# Patient Record
Sex: FEMALE | Race: WHITE | HISPANIC OR LATINO | Employment: UNEMPLOYED | URBAN - METROPOLITAN AREA
[De-identification: names, ages, dates, MRNs, and addresses within clinical notes are randomized per-mention and may not be internally consistent; named-entity substitution may affect disease eponyms.]

---

## 2017-01-03 ENCOUNTER — HOSPITAL ENCOUNTER (OUTPATIENT)
Dept: LAB | Facility: MEDICAL CENTER | Age: 27
End: 2017-01-03
Attending: PHYSICIAN ASSISTANT
Payer: COMMERCIAL

## 2017-01-03 DIAGNOSIS — Z34.82 PRENATAL CARE, SUBSEQUENT PREGNANCY, SECOND TRIMESTER: ICD-10-CM

## 2017-01-03 LAB
ABO GROUP BLD: NORMAL
AMORPHOUS CRYSTALS 1764: PRESENT /HPF
APPEARANCE UR: ABNORMAL
BASOPHILS # BLD AUTO: 0.06 K/UL (ref 0–0.12)
BASOPHILS NFR BLD AUTO: 0.6 % (ref 0–1.8)
BILIRUB UR QL STRIP.AUTO: NEGATIVE
BLD GP AB SCN SERPL QL: NORMAL
COLOR UR AUTO: ABNORMAL
CULTURE IF INDICATED INDCX: NO UA CULTURE
EOSINOPHIL # BLD: 0.06 K/UL (ref 0–0.51)
EOSINOPHIL NFR BLD AUTO: 0.6 % (ref 0–6.9)
EPITHELIAL CELLS 1715: NORMAL /HPF
ERYTHROCYTE [DISTWIDTH] IN BLOOD BY AUTOMATED COUNT: 40.3 FL (ref 35.9–50)
GLUCOSE UR STRIP.AUTO-MCNC: NEGATIVE MG/DL
HBV SURFACE AG SERPL QL IA: NEGATIVE
HCT VFR BLD AUTO: 38.8 % (ref 37–47)
HGB BLD-MCNC: 13.4 G/DL (ref 12–16)
HIV 1+2 AB+HIV1 P24 AG SERPL QL IA: NON REACTIVE
IMM GRANULOCYTES # BLD AUTO: 0.06 K/UL (ref 0–0.11)
IMM GRANULOCYTES NFR BLD AUTO: 0.6 % (ref 0–0.9)
KETONES UR STRIP.AUTO-MCNC: NEGATIVE MG/DL
LEUKOCYTE ESTERASE UR QL STRIP.AUTO: NEGATIVE
LYMPHOCYTES # BLD: 1.71 K/UL (ref 1–4.8)
LYMPHOCYTES NFR BLD AUTO: 17.3 % (ref 22–41)
MCH RBC QN AUTO: 30 PG (ref 27–33)
MCHC RBC AUTO-ENTMCNC: 34.5 G/DL (ref 33.6–35)
MCV RBC AUTO: 87 FL (ref 81.4–97.8)
MICRO URNS: ABNORMAL
MONOCYTES # BLD: 0.64 K/UL (ref 0–0.85)
MONOCYTES NFR BLD AUTO: 6.5 % (ref 0–13.4)
MUCOUS THREADS URNS QL MICRO: NORMAL /HPF
NEUTROPHILS # BLD: 7.37 K/UL (ref 2–7.15)
NEUTROPHILS NFR BLD AUTO: 74.4 % (ref 44–72)
NITRITE UR QL STRIP.AUTO: NEGATIVE
NRBC # BLD AUTO: 0 K/UL
NRBC BLD-RTO: 0 /100 WBC
PH UR: 6 [PH]
PLATELET # BLD AUTO: 229 K/UL (ref 164–446)
PMV BLD AUTO: 10 FL (ref 9–12.9)
PROT UR QL STRIP: 50 MG/DL
RBC # BLD AUTO: 4.46 M/UL (ref 4.2–5.4)
RBC UR QL AUTO: NEGATIVE
RH BLD: NORMAL
RUBV IGG SERPL IA-ACNC: 132.5 IU/ML
SP GR UR STRIP.AUTO: 1.03
TREPONEMA PALLIDUM IGG+IGM AB [PRESENCE] IN SERUM OR PLASMA BY IMMUNOASSAY: NON REACTIVE
WBC # BLD AUTO: 9.9 K/UL (ref 4.8–10.8)

## 2017-01-06 ENCOUNTER — ROUTINE PRENATAL (OUTPATIENT)
Dept: OBGYN | Facility: CLINIC | Age: 27
End: 2017-01-06

## 2017-01-06 VITALS — WEIGHT: 191 LBS | BODY MASS INDEX: 29.05 KG/M2 | SYSTOLIC BLOOD PRESSURE: 104 MMHG | DIASTOLIC BLOOD PRESSURE: 58 MMHG

## 2017-01-06 DIAGNOSIS — Z34.01 SUPERVISION OF NORMAL FIRST PREGNANCY IN FIRST TRIMESTER: Primary | ICD-10-CM

## 2017-01-06 PROCEDURE — 90040 PR PRENATAL FOLLOW UP: CPT | Performed by: PHYSICIAN ASSISTANT

## 2017-01-06 NOTE — PROGRESS NOTES
OB f/u. some fetal movement yet.  No VB, LOF or UC's.  Brownish discharge one time last week  Wt: 191lb      BP: 104/58  Good phone # [phone]  Preferred pharmacy confirmed.  US scheduled for 1/26/17  PNP done  AFP pended  Yeast on pap: burning with intercourse.

## 2017-01-06 NOTE — MR AVS SNAPSHOT
Laura Susie Parks   2017 1:30 PM   Routine Prenatal   MRN: [id number]    Department:  [facility]   Dept Phone:  [phone]    Description:  Female : 1990   Provider:  STEPHANIA Alvarez           Allergies as of 2017     No Known Allergies      You were diagnosed with     Supervision of normal first pregnancy in first trimester   [8455830]  -  Primary       Vital Signs     Blood Pressure Weight Smoking Status             104/58 mmHg 86.637 kg (191 lb) Never Smoker          Basic Information     Date Of Birth Sex Race Ethnicity Preferred Language    1990 Female White  Origin (Uzbek,Hong Konger,Solomon Islander,Sierra Leonean, etc) English      Your appointments     2017  1:30 PM   US PREG 60 with PREG CTR US 1   [facility] ([facility])    [facility]  [address] [id number] [phone]           For [facility] patients only: 1. Please arrive 15 min prior to your appointment time. 2. If you're late, you will be rescheduled for the next available appointment. 3. If you need to reschedule your appointment, please call us at [phone] 48 hours prior to your appointment. 4. Do not bring children as they will not be allowed in exam room. 5. Only one family member may be present in room during exam. 6. The exam will be 30-60 minutes depending on the exam ordered by the physician. 7. The sonographer is not allowed to discuss findings during the exam. Your provider will go over the results with you at your next appointment. 8. The purpose of this ultrasound is to determine if baby is healthy. Diagnostic ultrasounds are NOT to determine the gender of the baby. 9. NO photography or video recording is allowed in exam room. 10. NO cell phones allowed in the exam room. INFORMACION SOBRE BHATT ULTRASONIDO 1. Por favor de llegar 15 minutos antes de bhatt mahesh. 2. Si llega tarde, le tenemos que cambiar la  mahesh para otra fecha. 3. Si necesita cambiar bhatt mahesh, por favor llame 48 horas antes de la mahesh. [phone] 4. Por favor no traer niños. No se permiten en cuarto de Ultrasonido. 5. Solamente se permite adolfo persona en el cuarto rosanna el examen. 6. El examen dura 30-60 minutos, dependiendo del examen ordenado por el Doctor. 7. El Sonógrafo no está autorizado hablar sobre bhatt examen. Bhatt doctor o partera le va explicar los resultados en bhatt próxima mahesh. 8. El propósito del Ultrasonido es para determinar si bhatt benita viene saludable. No es para determinar el sexo de bhatt benita. 9. Por favor no fotos o cámaras de grabar. 10. No celulares permitidos en el cuarto de examen.              Problem List              ICD-10-CM Priority Class Noted - Resolved    Supervision of normal first pregnancy in first trimester Z34.01   12/8/2016 - Present      Health Maintenance        Date Due Completion Dates    IMM HEP B VACCINE (1 of 3 - Primary Series) 1990 ---    IMM HEP A VACCINE (1 of 2 - Standard Series) 12/3/1992 ---    IMM HPV VACCINE (1 of 3 - Female 3 Dose Series) 12/3/2002 ---    IMM VARICELLA (CHICKENPOX) VACCINE (1 of 2 - 2 Dose Adolescent Series) 12/3/2004 ---    IMM DTaP/Tdap/Td Vaccine (1 - Tdap) 12/3/2010 ---    IMM INFLUENZA (1) 9/1/2016 ---    PAP SMEAR 12/8/2019 12/8/2016            Current Immunizations     No immunizations on file.      Below and/or attached are the medications your provider expects you to take. Review all of your home medications and newly ordered medications with your provider and/or pharmacist. Follow medication instructions as directed by your provider and/or pharmacist. Please keep your medication list with you and share with your provider. Update the information when medications are discontinued, doses are changed, or new medications (including over-the-counter products) are added; and carry medication information at all times in the event of emergency situations     Allergies:  No Known  Allergies          Medications  Valid as of: January 06, 2017 -  1:51 PM    Generic Name Brand Name Tablet Size Instructions for use    Folic Acid   Take  by mouth.        Nitrofurantoin Monohyd Macro   Take  by mouth.        Prenatal MV-Min-Fe Fum-FA-DHA   Take  by mouth.        .                 Medicines prescribed today were sent to:     [facility] [address] [place] [address], [place] - [address]    [address] [place] [address]    Phone: [phone] Fax: [phone]    Open 24 Hours?: No      Medication refill instructions:       If your prescription bottle indicates you have medication refills left, it is not necessary to call your provider’s office. Please contact your pharmacy and they will refill your medication.    If your prescription bottle indicates you do not have any refills left, you may request refills at any time through one of the following ways: The online [employer] system (except Urgent Care), by calling your provider’s office, or by asking your pharmacy to contact your provider’s office with a refill request. Medication refills are processed only during regular business hours and may not be available until the next business day. Your provider may request additional information or to have a follow-up visit with you prior to refilling your medication.   *Please Note: Medication refills are assigned a new Rx number when refilled electronically. Your pharmacy may indicate that no refills were authorized even though a new prescription for the same medication is available at the pharmacy. Please request the medicine by name with the pharmacy before contacting your provider for a refill.        Your To Do List     Future Labs/Procedures Complete By [employer]pmirna    [facility] TETRA  As directed 1/7/2018         [employer] Access Code: Activation code not generated  Current [employer] Status: Active

## 2017-01-06 NOTE — PROGRESS NOTES
Pt has no complaints with cramping, bleeding or pain, but pt has had occ burning with intercourse only. +FM. PAP, GC/CT, PNL wnl - pt notified of results. Yeast seen on PAP, but wet mt today shows no yeast, clue cells or trich. Pt urged to try lubricant with intercourse, call if pain occurs. AFP slip given today. US 1/26. RTC 4 wk or sooner prn.

## 2017-01-23 ENCOUNTER — HOSPITAL ENCOUNTER (OUTPATIENT)
Dept: LAB | Facility: MEDICAL CENTER | Age: 27
End: 2017-01-23
Attending: PHYSICIAN ASSISTANT
Payer: COMMERCIAL

## 2017-01-23 DIAGNOSIS — Z34.01 SUPERVISION OF NORMAL FIRST PREGNANCY IN FIRST TRIMESTER: ICD-10-CM

## 2017-01-25 LAB
# FETUSES US: NORMAL
AFP MOM SERPL: 0.57
AFP SERPL-MCNC: 26 NG/ML
AGE - REPORTED: 25.5 YR
GA METHOD: NORMAL
GA: 19.71 WEEKS
HCG MOM SERPL: 0.79
HCG SERPL-ACNC: NORMAL IU/L
IDDM PATIENT QL: NO
INHIBIN A MOM SERPL: 0.78
INHIBIN A SERPL-MCNC: 121 PG/ML
INTEGRATED SCN PATIENT-IMP: NORMAL
PATHOLOGY STUDY: NORMAL
U ESTRIOL MOM SERPL: 0.9
U ESTRIOL SERPL-MCNC: 1.71 NG/ML

## 2017-01-26 ENCOUNTER — DATING (OUTPATIENT)
Dept: OBGYN | Facility: CLINIC | Age: 27
End: 2017-01-26

## 2017-01-26 ENCOUNTER — APPOINTMENT (OUTPATIENT)
Dept: RADIOLOGY | Facility: IMAGING CENTER | Age: 27
End: 2017-01-26
Attending: PHYSICIAN ASSISTANT

## 2017-01-26 DIAGNOSIS — Z34.82 PRENATAL CARE, SUBSEQUENT PREGNANCY, SECOND TRIMESTER: ICD-10-CM

## 2017-01-26 PROCEDURE — 76805 OB US >/= 14 WKS SNGL FETUS: CPT | Mod: 26 | Performed by: OBSTETRICS & GYNECOLOGY

## 2017-01-30 ENCOUNTER — TELEPHONE (OUTPATIENT)
Dept: OBGYN | Facility: CLINIC | Age: 27
End: 2017-01-30

## 2017-01-30 NOTE — TELEPHONE ENCOUNTER
Pt LM requesting lab results on AFP test. I called patient back and notified AFP was normal screen. Pt had no other questions or concerns.

## 2017-02-03 ENCOUNTER — ROUTINE PRENATAL (OUTPATIENT)
Dept: OBGYN | Facility: CLINIC | Age: 27
End: 2017-02-03

## 2017-02-03 VITALS — BODY MASS INDEX: 30.11 KG/M2 | WEIGHT: 198 LBS | SYSTOLIC BLOOD PRESSURE: 104 MMHG | DIASTOLIC BLOOD PRESSURE: 60 MMHG

## 2017-02-03 DIAGNOSIS — Z34.01 SUPERVISION OF NORMAL FIRST PREGNANCY IN FIRST TRIMESTER: ICD-10-CM

## 2017-02-03 PROCEDURE — 90040 PR PRENATAL FOLLOW UP: CPT | Performed by: PHYSICIAN ASSISTANT

## 2017-02-03 NOTE — PROGRESS NOTES
Pt here today for OB follow up  Reports +FM  Denies any complaints  Pt had US done on 1/26/17  WT:198lb  BP: 104/60  Good # [phone]

## 2017-02-03 NOTE — MR AVS SNAPSHOT
Laura Parks   2/3/2017 2:30 PM   Routine Prenatal   MRN: [id number]    Department:  [facility]   Dept Phone:  [phone]    Description:  Female : 1990   Provider:  STEPHANIA Alvarez           Allergies as of 2/3/2017     No Known Allergies      You were diagnosed with     Supervision of normal first pregnancy in first trimester   [[id number]]         Vital Signs     Blood Pressure Weight Smoking Status             104/60 mmHg 89.812 kg (198 lb) Never Smoker          Basic Information     Date Of Birth Sex Race Ethnicity Preferred Language    1990 Female White  Origin (Belizean,Tajik,Citizen of [place],Iranian, etc) English      Problem List              ICD-10-CM Priority Class Noted - Resolved    Supervision of normal first pregnancy in first trimester Z34.01   2016 - Present      Health Maintenance        Date Due Completion Dates    IMM HEP B VACCINE (1 of 3 - Primary Series) 1990 ---    IMM HEP A VACCINE (1 of 2 - Standard Series) 12/3/1992 ---    IMM HPV VACCINE (1 of 3 - Female 3 Dose Series) 12/3/2002 ---    IMM VARICELLA (CHICKENPOX) VACCINE (1 of 2 - 2 Dose Adolescent Series) 12/3/2004 ---    IMM DTaP/Tdap/Td Vaccine (1 - Tdap) 12/3/2010 ---    IMM INFLUENZA (1) 2016 ---    PAP SMEAR 2019            Current Immunizations     No immunizations on file.      Below and/or attached are the medications your provider expects you to take. Review all of your home medications and newly ordered medications with your provider and/or pharmacist. Follow medication instructions as directed by your provider and/or pharmacist. Please keep your medication list with you and share with your provider. Update the information when medications are discontinued, doses are changed, or new medications (including over-the-counter products) are added; and carry medication information at all times in the event of emergency situations     Allergies:  No Known  Allergies          Medications  Valid as of: February 03, 2017 -  2:44 PM    Generic Name Brand Name Tablet Size Instructions for use    Folic Acid   Take  by mouth.        Nitrofurantoin Monohyd Macro   Take  by mouth.        Prenatal MV-Min-Fe Fum-FA-DHA   Take  by mouth.        .                 Medicines prescribed today were sent to:     [facility] [address] [place] [address], [place] - [address]    [address] [place] [address]    Phone: [phone] Fax: [phone]    Open 24 Hours?: No      Medication refill instructions:       If your prescription bottle indicates you have medication refills left, it is not necessary to call your provider’s office. Please contact your pharmacy and they will refill your medication.    If your prescription bottle indicates you do not have any refills left, you may request refills at any time through one of the following ways: The online [employer] system (except Urgent Care), by calling your provider’s office, or by asking your pharmacy to contact your provider’s office with a refill request. Medication refills are processed only during regular business hours and may not be available until the next business day. Your provider may request additional information or to have a follow-up visit with you prior to refilling your medication.   *Please Note: Medication refills are assigned a new Rx number when refilled electronically. Your pharmacy may indicate that no refills were authorized even though a new prescription for the same medication is available at the pharmacy. Please request the medicine by name with the pharmacy before contacting your provider for a refill.        Other Notes About Your Plan     SONYA Gipson           [employer] Access Code: Activation code not generated  Current [employer] Status: Active

## 2017-03-06 ENCOUNTER — ROUTINE PRENATAL (OUTPATIENT)
Dept: OBGYN | Facility: CLINIC | Age: 27
End: 2017-03-06

## 2017-03-06 VITALS — DIASTOLIC BLOOD PRESSURE: 62 MMHG | WEIGHT: 198 LBS | BODY MASS INDEX: 30.11 KG/M2 | SYSTOLIC BLOOD PRESSURE: 108 MMHG

## 2017-03-06 DIAGNOSIS — Z34.01 SUPERVISION OF NORMAL FIRST PREGNANCY IN FIRST TRIMESTER: ICD-10-CM

## 2017-03-06 PROCEDURE — 90040 PR PRENATAL FOLLOW UP: CPT | Performed by: NURSE PRACTITIONER

## 2017-03-06 NOTE — MR AVS SNAPSHOT
Laura Parks   3/6/2017 1:00 PM   Routine Prenatal   MRN: [id number]    Department:  [facility]   Dept Phone:  [phone]    Description:  Female : 1990   Provider:  KARLI Sood           Allergies as of 3/6/2017     No Known Allergies      You were diagnosed with     Supervision of normal first pregnancy in first trimester   [[id number]]         Vital Signs     Blood Pressure Weight Smoking Status             108/62 mmHg 89.812 kg (198 lb) Never Smoker          Basic Information     Date Of Birth Sex Race Ethnicity Preferred Language    1990 Female White  Origin (Chadian,Malaysian,Colombian,Milan, etc) English      Your appointments     2017  1:30 PM   OB Follow Up with STEPHANIA Alvarez   [facility] [address]  [facility] [address] [phone]              Problem List              ICD-10-CM Priority Class Noted - Resolved    Supervision of normal first pregnancy in first trimester Z34.01   2016 - Present      Health Maintenance        Date Due Completion Dates    IMM HEP B VACCINE (1 of 3 - Primary Series) 1990 ---    IMM HEP A VACCINE (1 of 2 - Standard Series) 12/3/1992 ---    IMM HPV VACCINE (1 of 3 - Female 3 Dose Series) 12/3/2002 ---    IMM VARICELLA (CHICKENPOX) VACCINE (1 of 2 - 2 Dose Adolescent Series) 12/3/2004 ---    IMM DTaP/Tdap/Td Vaccine (1 - Tdap) 12/3/2010 ---    IMM INFLUENZA (1) 2016 ---    PAP SMEAR 2019            Current Immunizations     No immunizations on file.      Below and/or attached are the medications your provider expects you to take. Review all of your home medications and newly ordered medications with your provider and/or pharmacist. Follow medication instructions as directed by your provider and/or pharmacist. Please keep your medication list with you and share with your provider. Update the information when medications are discontinued,  doses are changed, or new medications (including over-the-counter products) are added; and carry medication information at all times in the event of emergency situations     Allergies:  No Known Allergies          Medications  Valid as of: March 06, 2017 -  1:44 PM    Generic Name Brand Name Tablet Size Instructions for use    Folic Acid   Take  by mouth.        Nitrofurantoin Monohyd Macro   Take  by mouth.        Prenatal MV-Min-Fe Fum-FA-DHA   Take  by mouth.        .                 Medicines prescribed today were sent to:     [facility] [address] [place] [address] - [address] [place] [address]    Phone: [phone] Fax: [phone]    Open 24 Hours?: No      Medication refill instructions:       If your prescription bottle indicates you have medication refills left, it is not necessary to call your provider’s office. Please contact your pharmacy and they will refill your medication.    If your prescription bottle indicates you do not have any refills left, you may request refills at any time through one of the following ways: The online [email or web address] system (except Urgent Care), by calling your provider’s office, or by asking your pharmacy to contact your provider’s office with a refill request. Medication refills are processed only during regular business hours and may not be available until the next business day. Your provider may request additional information or to have a follow-up visit with you prior to refilling your medication.   *Please Note: Medication refills are assigned a new Rx number when refilled electronically. Your pharmacy may indicate that no refills were authorized even though a new prescription for the same medication is available at the pharmacy. Please request the medicine by name with the pharmacy before contacting your provider for a refill.        Your To Do List     Future Labs/Procedures Complete By Expires    GLUCOSE 1HR GESTATIONAL  As directed 3/7/2018    HCT  As directed  3/7/2018    HGB  As directed 3/7/2018    T.PALLIDUM AB EIA  As directed 3/7/2018      Other Notes About Your Plan     SONYA - Elan Gipson           MyChart Access Code: Activation code not generated  Current MyChart Status: Active

## 2017-03-06 NOTE — PROGRESS NOTES
OB f/u   + fetal movement.  No VB, LOF or UC's.  Good phone # [phone]  Preferred pharmacy confirmed.   3rd trimester lab orders and instructions given to patient  Pt reports no problems at this time

## 2017-03-16 ENCOUNTER — HOSPITAL ENCOUNTER (OUTPATIENT)
Dept: LAB | Facility: MEDICAL CENTER | Age: 27
End: 2017-03-16
Attending: NURSE PRACTITIONER
Payer: COMMERCIAL

## 2017-03-16 DIAGNOSIS — Z34.01 SUPERVISION OF NORMAL FIRST PREGNANCY IN FIRST TRIMESTER: ICD-10-CM

## 2017-03-16 LAB
GLUCOSE 1H P 50 G GLC PO SERPL-MCNC: 112 MG/DL (ref 70–139)
HCT VFR BLD AUTO: 40.1 % (ref 37–47)
HGB BLD-MCNC: 13.3 G/DL (ref 12–16)
TREPONEMA PALLIDUM IGG+IGM AB [PRESENCE] IN SERUM OR PLASMA BY IMMUNOASSAY: NON REACTIVE

## 2017-04-12 ENCOUNTER — ROUTINE PRENATAL (OUTPATIENT)
Dept: OBGYN | Facility: CLINIC | Age: 27
End: 2017-04-12

## 2017-04-12 VITALS — DIASTOLIC BLOOD PRESSURE: 64 MMHG | BODY MASS INDEX: 29.96 KG/M2 | SYSTOLIC BLOOD PRESSURE: 106 MMHG | WEIGHT: 197 LBS

## 2017-04-12 DIAGNOSIS — Z34.01 SUPERVISION OF NORMAL FIRST PREGNANCY IN FIRST TRIMESTER: ICD-10-CM

## 2017-04-12 PROCEDURE — 90040 PR PRENATAL FOLLOW UP: CPT | Performed by: NURSE PRACTITIONER

## 2017-04-12 PROCEDURE — 90471 IMMUNIZATION ADMIN: CPT | Performed by: NURSE PRACTITIONER

## 2017-04-12 PROCEDURE — 90715 TDAP VACCINE 7 YRS/> IM: CPT | Performed by: NURSE PRACTITIONER

## 2017-04-12 NOTE — Clinical Note
"Count Your Baby's Movements  Another step to a healthy delivery    Laura Aden             Dept: [phone]    How Many Weeks Pregnant? 31w0d    Date to Begin Countin17              How to use this chart    One way for your physician to keep track of your baby's health is by knowing how often the baby moves (or \"kicks\") in your womb.  You can help your physician to do this by using this chart every day.    Every day, you should see how many hours it takes for your baby to move 10 times.  Start in the morning, as soon as you get up.    · First, write down the time your baby moves until you get to 10.  · Check off one box every time your baby moves until you get to 10.  · Write down the time you finished counting in the last column.  · Total how long it took to count up all 10 movements.  · Finally, fill in the box that shows how long this took.  After counting 10 movements, you no longer have to count any more that day.  The next morning, just start counting again as soon as you get up.    What should you call a \"movement\"?  It is hard to say, because it will feel different from one mother to another and from one pregnancy to the next.  The important thing is that you count the movements the same way throughout your pregnancy.  If you have more questions, you should ask your physician.    Count carefully every day!  SAMPLE:  Week 28    How many hours did it take to feel 10 movements?       Start  Time     1     2     3     4     5     6     7     8     9     10   Finish Time   Mon 8:20 ·  ·  ·  ·  ·  ·  ·  ·  ·  ·  11:40                  Sat               Sun                 IMPORTANT: You should contact your physician if it takes more than two hours for you to feel 10 movements.  Each morning, write down the time and start to count the movements of your baby.  Keep track by checking off one box every time you feel one movement.  When you have " "felt 10 \"kicks\", write down the time you finished counting in the last column.  Then fill in the   box (over the check josefina) for the number of hours it took.  Be sure to read the complete instructions on the previous page.            "

## 2017-04-12 NOTE — MR AVS SNAPSHOT
Laura Parks   2017 1:30 PM   Routine Prenatal   MRN: [id number]    Department[facility]  [facility]   Dept Phone:  [phone]    Description:  Female : 1990   Provider:  Ally Redd D.N.P.           Allergies as of 2017     No Known Allergies      You were diagnosed with     Supervision of normal first pregnancy in first trimester   [[id number]]         Vital Signs     Blood Pressure Weight Smoking Status             106/64 mmHg 89.359 kg (197 lb) Never Smoker          Basic Information     Date Of Birth Sex Race Ethnicity Preferred Language    1990 Female White  Origin (Kazakh,Sudanese,Montenegrin,Milan, etc) English      Your appointments     2017  2:45 PM   OB Follow Up with Ally Redd D.N.P.   [facility] [address]  [facility] [address]   [phone]            May 12, 2017 11:30 AM   OB Follow Up with STEPHANIA Alvarez   [facility] [address]  [facility] [address]   [phone]            May 18, 2017 11:30 AM   OB Follow Up with STEPHANIA Alvarez   [facility] [address]  [facility] [address]   [phone]            May 26, 2017 11:30 AM   OB Follow Up with STEPHANIA Alvarez   [facility] [address]  [facility] [address]   [phone]              Problem List              ICD-10-CM Priority Class Noted - Resolved    Supervision of normal first pregnancy in first trimester Z34.01   2016 - Present      Health Maintenance        Date Due Completion Dates    IMM HEP B VACCINE (1 of 3 - Primary Series) 1990 ---    IMM HEP A VACCINE (1 of 2 - Standard Series) 12/3/1992 ---    IMM HPV VACCINE (1 of 3 - Female 3 Dose Series) 12/3/2002 ---    IMM VARICELLA (CHICKENPOX) VACCINE (1 of 2 - 2 Dose Adolescent Series) 12/3/2004 ---    IMM DTaP/Tdap/Td Vaccine (1 - Tdap) 12/3/2010 ---    PAP SMEAR 2019  12/8/2016            Current Immunizations     Tdap Vaccine 4/12/2017  1:51 PM      Below and/or attached are the medications your provider expects you to take. Review all of your home medications and newly ordered medications with your provider and/or pharmacist. Follow medication instructions as directed by your provider and/or pharmacist. Please keep your medication list with you and share with your provider. Update the information when medications are discontinued, doses are changed, or new medications (including over-the-counter products) are added; and carry medication information at all times in the event of emergency situations     Allergies:  No Known Allergies          Medications  Valid as of: April 12, 2017 -  2:13 PM    Generic Name Brand Name Tablet Size Instructions for use    Folic Acid   Take  by mouth.        Nitrofurantoin Monohyd Macro   Take  by mouth.        Prenatal MV-Min-Fe Fum-FA-DHA   Take  by mouth.        .                 Medicines prescribed today were sent to:     [facility] [address] [place] [address] [address] [facility]    [address] [place] [address]    Phone: [phone] Fax: [phone]    Open 24 Hours?: No      Medication refill instructions:       If your prescription bottle indicates you have medication refills left, it is not necessary to call your provider’s office. Please contact your pharmacy and they will refill your medication.    If your prescription bottle indicates you do not have any refills left, you may request refills at any time through one of the following ways: The online [employer] system (except Urgent Care), by calling your provider’s office, or by asking your pharmacy to contact your provider’s office with a refill request. Medication refills are processed only during regular business hours and may not be available until the next business day. Your provider may request additional information or to have a follow-up visit with you prior to refilling your medication.      *Please Note: Medication refills are assigned a new Rx number when refilled electronically. Your pharmacy may indicate that no refills were authorized even though a new prescription for the same medication is available at the pharmacy. Please request the medicine by name with the pharmacy before contacting your provider for a refill.        Other Notes About Your Plan     SONYA Hill Access Code: Activation code not generated  Current MyChart Status: Active

## 2017-04-12 NOTE — PROGRESS NOTES
S) Pt is a 26 y.o.   at 31w0d  gestation. Routine prenatal care today. States no problems today. Asking good questions about  labor/labor/contraception.  Reports good  fetal movement. Denies cramping, bleeding or leaking of fluid. Denies dysuria. Generally feels well today. Good self-care activities identified. Denies headaches.     O) see flow         Filed Vitals:    17 1334   BP: 106/64   Weight: 89.359 kg (197 lb)           Lab: normal prenatal panel, normal glucose.        Pertinent ultrasound - normal fetal survey            A) IUP at 31w0d       S=D         Patient Active Problem List    Diagnosis Date Noted   • Supervision of normal first pregnancy in first trimester 2016     P) s/s ptl vs general discomforts. Fetal movements reviewed. General ed and anticipatory guidance. Nutrition/exercise/vitamin. Plans breast.  Plans pp contraception - plans micronor. Wants TDAP today. Continue PNV.

## 2017-04-28 ENCOUNTER — ROUTINE PRENATAL (OUTPATIENT)
Dept: OBGYN | Facility: CLINIC | Age: 27
End: 2017-04-28

## 2017-04-28 VITALS — BODY MASS INDEX: 30.26 KG/M2 | SYSTOLIC BLOOD PRESSURE: 108 MMHG | DIASTOLIC BLOOD PRESSURE: 68 MMHG | WEIGHT: 199 LBS

## 2017-04-28 DIAGNOSIS — Z34.01 SUPERVISION OF NORMAL FIRST PREGNANCY IN FIRST TRIMESTER: ICD-10-CM

## 2017-04-28 PROCEDURE — 90040 PR PRENATAL FOLLOW UP: CPT | Performed by: NURSE PRACTITIONER

## 2017-04-28 NOTE — MR AVS SNAPSHOT
Laura Parks   2017 2:45 PM   Routine Prenatal   MRN: [id number]    [facility]   Dept Phone:  [phone]    Description:  Female : 1990   Provider:  Ally Redd D.N.P.           Allergies as of 2017     No Known Allergies      You were diagnosed with     Supervision of normal first pregnancy in first trimester   [[id number]]         Vital Signs     Blood Pressure Weight Smoking Status             108/68 mmHg 90.266 kg (199 lb) Never Smoker          Basic Information     Date Of Birth Sex Race Ethnicity Preferred Language    1990 Female White  Origin (Nepali,Faroese,Peruvian,Milan, etc) English      Your appointments     May 12, 2017 11:30 AM   OB Follow Up with STEPHANIA Alvarez   [facility] [address]  [facility] [address]   [phone]            May 18, 2017 11:30 AM   OB Follow Up with STEPHANIA Alvarez   [facility] [address]  [facility] [address]   [phone]            May 26, 2017 11:30 AM   OB Follow Up with STEPHANIA Alvarez   [facility] [address]  [facility] [address]   [phone]              Problem List              ICD-10-CM Priority Class Noted - Resolved    Supervision of normal first pregnancy in first trimester Z34.01   2016 - Present      Health Maintenance        Date Due Completion Dates    IMM HEP B VACCINE (1 of 3 - Primary Series) 1990 ---    IMM HEP A VACCINE (1 of 2 - Standard Series) 12/3/1992 ---    IMM HPV VACCINE (1 of 3 - Female 3 Dose Series) 12/3/2002 ---    IMM VARICELLA (CHICKENPOX) VACCINE (1 of 2 - 2 Dose Adolescent Series) 12/3/2004 ---    PAP SMEAR 2019    IMM DTaP/Tdap/Td Vaccine (2 - Td) 2027            Current Immunizations     Tdap Vaccine 2017  1:51 PM      Below and/or attached are the medications your provider expects you to take. Review  all of your home medications and newly ordered medications with your provider and/or pharmacist. Follow medication instructions as directed by your provider and/or pharmacist. Please keep your medication list with you and share with your provider. Update the information when medications are discontinued, doses are changed, or new medications (including over-the-counter products) are added; and carry medication information at all times in the event of emergency situations     Allergies:  No Known Allergies          Medications  Valid as of: April 28, 2017 -  3:01 PM    Generic Name Brand Name Tablet Size Instructions for use    Folic Acid   Take  by mouth.        Nitrofurantoin Monohyd Macro   Take  by mouth.        Prenatal MV-Min-Fe Fum-FA-DHA   Take  by mouth.        .                 Medicines prescribed today were sent to:     [facility] [address] [place] [address] - [address]     [address] [facility] [place] [address]    Phone: [phone] Fax: [phone]    Open 24 Hours?: No      Medication refill instructions:       If your prescription bottle indicates you have medication refills left, it is not necessary to call your provider’s office. Please contact your pharmacy and they will refill your medication.    If your prescription bottle indicates you do not have any refills left, you may request refills at any time through one of the following ways: The online Jukedeck system (except Urgent Care), by calling your provider’s office, or by asking your pharmacy to contact your provider’s office with a refill request. Medication refills are processed only during regular business hours and may not be available until the next business day. Your provider may request additional information or to have a follow-up visit with you prior to refilling your medication.   *Please Note: Medication refills are assigned a new Rx number when refilled electronically. Your pharmacy may indicate that no refills were authorized even though a new  prescription for the same medication is available at the pharmacy. Please request the medicine by name with the pharmacy before contacting your provider for a refill.        Other Notes About Your Plan     [facility] Access Code: Activation code not generated  Current [employer] Status: Active

## 2017-04-28 NOTE — PROGRESS NOTES
S) Pt is a 26 y.o.   at 33w2d  gestation. Routine prenatal care today. Patient reports vaginal pain and lower back pain 1 x per day. States under a lot of stress. No other presenting issues.  Reports good  fetal movement. Denies cramping, bleeding or leaking of fluid. Denies dysuria. Generally feels well today. Good self-care activities identified. Denies headaches.     O) see flow         Filed Vitals:    17 1446   BP: 108/68   Weight: 90.266 kg (199 lb)           Lab: normal prenatal panel       Pertinent ultrasound -       Normal fetal survey     A) IUP at 33w2d       S=D         Patient Active Problem List    Diagnosis Date Noted   • Supervision of normal first pregnancy in first trimester 2016     P) s/s labor vs general discomforts. Fetal movements reviewed. General ed and anticipatory guidance. Nutrition/exercise/vitamin. Plans breast. Continue PNV. Mental health referrals given.

## 2017-04-28 NOTE — PROGRESS NOTES
Pt here today for OB follow up  Reports +FM  WT: 199 lb  BP: 108/68  Pt states having sharp vaginal pains and lower back pain. Pt also reports having stress and anxiety.   Good # [phone]

## 2017-04-29 ENCOUNTER — HOSPITAL ENCOUNTER (EMERGENCY)
Facility: MEDICAL CENTER | Age: 27
End: 2017-04-29
Attending: EMERGENCY MEDICINE
Payer: MEDICAID

## 2017-04-29 VITALS
BODY MASS INDEX: 30.07 KG/M2 | RESPIRATION RATE: 16 BRPM | WEIGHT: 198.41 LBS | DIASTOLIC BLOOD PRESSURE: 71 MMHG | OXYGEN SATURATION: 100 % | SYSTOLIC BLOOD PRESSURE: 114 MMHG | HEIGHT: 68 IN | HEART RATE: 60 BPM | TEMPERATURE: 97 F

## 2017-04-29 DIAGNOSIS — S61.012A THUMB LACERATION, LEFT, INITIAL ENCOUNTER: ICD-10-CM

## 2017-04-29 PROCEDURE — 304999 HCHG REPAIR-SIMPLE/INTERMED LEVEL 1

## 2017-04-29 PROCEDURE — 99283 EMERGENCY DEPT VISIT LOW MDM: CPT

## 2017-04-29 PROCEDURE — 303353 HCHG DERMABOND SKIN ADHESIVE

## 2017-04-29 ASSESSMENT — PAIN SCALES - WONG BAKER: WONGBAKER_NUMERICALRESPONSE: HURTS JUST A LITTLE BIT

## 2017-04-29 NOTE — DISCHARGE INSTRUCTIONS
Fingertip Laceration  The treatment of fingertip injuries depends on how large the cut is and whether the bone or nail tissue has been damaged. Amputations of the skin over the tip of the finger that is smaller than a dime (smaller than 1cm) will usually heal very well from the sides without any treatment other than cleaning the wound and changing the dressing.  Keep your hand elevated for the next 2 to 3 days to reduce pain and swelling. A splint over the fingertip may be needed to protect your injury. If your cut is being allowed to heal in from the sides, you should soak it in warm water and change the dressing daily.   You may need a tetanus shot if:  · You cannot remember when you had your last tetanus shot.  · You have never had a tetanus shot.  · The injury broke your skin.  If you got a tetanus shot, your arm may swell, get red, and feel warm to the touch. This is common and not a problem. If you need a tetanus shot and you choose not to have one, there is a rare chance of getting tetanus. Sickness from tetanus can be serious.  SEEK MEDICAL CARE IF:   · There are any signs of infection: increased redness, swelling, and pain, or sometimes pus drainage.  Document Released: 01/25/2006 Document Revised: 03/11/2013 Document Reviewed: 01/21/2010  [employer]® Patient Information ©2014 [employer].

## 2017-04-29 NOTE — ED PROVIDER NOTES
"ED Provider Note    CHIEF COMPLAINT  Chief Complaint   Patient presents with   • T-5000 Lacerations       [facility]  Laura Parks is a 26 y.o. female who presents for evaluation of cut to thumb. The patient reports that she actually sliced her thumb cutting fruit. Tetanus up-to-date. Injury occurred about an hour ago. She did not amputated digit. She has no other complaints. She is notably 33 weeks pregnant and did report some crampy intermittent waves of pain. She is never had contractions denies any leakage of fluid or vaginal bleeding    REVIEW OF SYSTEMS  See HPI for further details. No numbness tingling weakness All other systems are negative.     PAST MEDICAL HISTORY  History reviewed. No pertinent past medical history.    FAMILY HISTORY  None reported    SOCIAL HISTORY  Social History     Social History   • Marital Status:      Spouse Name: N/A   • Number of Children: N/A   • Years of Education: N/A     Social History Main Topics   • Smoking status: Never Smoker    • Smokeless tobacco: None   • Alcohol Use: No      Comment: occ   • Drug Use: No   • Sexual Activity:     Partners: Male      Comment: none      Other Topics Concern   • None     Social History Narrative    denies drugs or alcohol    SURGICAL HISTORY  Past Surgical History   Procedure Laterality Date   • Other orthopedic surgery  1996     L elbow surgery after fracture       CURRENT MEDICATIONS  Home Medications     Reviewed by Alise Naik R.N. (Registered Nurse) on 04/29/17 at 1518  Med List Status: Complete    Medication Last Dose Status    FOLIC ACID PO  Active    Prenatal MV-Min-Fe Fum-FA-DHA (PRENATAL 1 PO) 4/29/2017 Active                ALLERGIES  No Known Allergies    PHYSICAL EXAM  VITAL SIGNS: /71 mmHg  Pulse 60  Temp(Src) 36.1 °C (97 °F) (Temporal)  Resp 16  Ht 1.73 m (5' 8.11\")  Wt 90 kg (198 lb 6.6 oz)  BMI 30.07 kg/m2  SpO2 100% Room air O2: 99    Constitutional: Well developed, Well nourished, No " acute distress, Non-toxic appearance.   HENT: Normocephalic, Atraumatic, Bilateral external ears normal, Oropharynx moist, No oral exudates, Nose normal.   Eyes: PERRLA, EOMI, Conjunctiva normal, No discharge.   Neck: Normal range of motion, No tenderness, Supple, No stridor.   Cardiovascular: Normal heart rate, Normal rhythm, No murmurs, No rubs, No gallops.   Thorax & Lungs: Normal breath sounds, No respiratory distress, No wheezing, No chest tenderness.   Abdomen: Bowel sounds normal, Soft, No tenderness, No masses, No pulsatile masses. Gravid no tenderness  Skin: Warm, Dry, No erythema, No rash.   Extremities: There is a 7 mm C shaped skin flap laceration. Nonsuturable distal sensation is intact no active bleeding   Neurologic: Alert & oriented x 3, Normal motor function, Normal sensory function, No focal deficits noted.   Psychiatric: Affect normal, Judgment normal, Mood normal.     The wound was gently cleaned with rubbing alcohol. It appeared to be a flap that was viable. I felt that tissue adhesive was the most useful closure method. 2 layers of Dermabond were gently placed onto the wound. It dried position. No complications    COURSE & MEDICAL DECISION MAKING  Pertinent Labs & Imaging studies reviewed. (See chart for details)  Patient was having some waves of discomfort there were concerning for possible contractions. She has no blood vaginal bleeding or leakage of fluids. After she was evaluated here in the wound was primarily managed we took her up into a wheelchair wheelchair to labor and delivery    FINAL IMPRESSION  1.  1. Thumb laceration, left, initial encounter               Electronically signed by: Raj Hart, 4/29/2017 3:29 PM

## 2017-04-29 NOTE — ED AVS SNAPSHOT
[employer] Medical Access Code: Activation code not generated  Current [employer] Medical Status: Active    wutabouthart  A secure, online tool to manage your health information     [employer]’s [employer] Medical® is a secure, online tool that connects you to your personalized health information from the privacy of your home -- day or night - making it very easy for you to manage your healthcare. Once the activation process is completed, you can even access your medical information using the Val[employer] Medical maik, which is available for free in the Apple Maik store or Google Play store.     [employer] Medical provides the following levels of access (as shown below):   My Chart Features   [facility] Primary Care Doctor [facility]  Specialists [facility]  Urgent  Care Non-[facility]  Primary Care  Doctor   Email your healthcare team securely and privately 24/7 X X X X   Manage appointments: schedule your next appointment; view details of past/upcoming appointments X      Request prescription refills. X      View recent personal medical records, including lab and immunizations X X X X   View health record, including health history, allergies, medications X X X X   Read reports about your outpatient visits, procedures, consult and ER notes X X X X   See your discharge summary, which is a recap of your hospital and/or ER visit that includes your diagnosis, lab results, and care plan. X X       How to register for [employer] Medical:  1. Go to  [email or web address].  2. Click on the Sign Up Now box, which takes you to the New Member Sign Up page. You will need to provide the following information:  a. Enter your Val[employer] Medical Access Code exactly as it appears at the top of this page. (You will not need to use this code after you’ve completed the sign-up process. If you do not sign up before the expiration date, you must request a new code.)   b. Enter your date of birth.   c. Enter your home email address.   d. Click Submit, and follow the next screen’s instructions.  3. Create a Valcare Medical ID. This will  be your Vontoo login ID and cannot be changed, so think of one that is secure and easy to remember.  4. Create a Vontoo password. You can change your password at any time.  5. Enter your Password Reset Question and Answer. This can be used at a later time if you forget your password.   6. Enter your e-mail address. This allows you to receive e-mail notifications when new information is available in Vontoo.  7. Click Sign Up. You can now view your health information.    For assistance activating your [employer] account, call [phone]

## 2017-04-29 NOTE — ED NOTES
Pt was cutting fruit and cut through left thumb.  Pt is 33 weeks pregnant with no related complaints.

## 2017-04-29 NOTE — ED AVS SNAPSHOT
4/29/2017    Laura Parks  [address]  [facility] [address]    Dear Laura:    [facility] wants to ensure your discharge home is safe and you or your loved ones have had all of your questions answered regarding your care after you leave the hospital.    Below is a list of resources and contact information should you have any questions regarding your hospital stay, follow-up instructions, or active medical symptoms.    Questions or Concerns Regarding… Contact   Medical Questions Related to Your Discharge  (7 days a week, 8am-5pm) Contact a Nurse Care Coordinator   [phone]   Medical Questions Not Related to Your Discharge  (24 hours a day / 7 days a week)  Contact the Nurse Health Line   [phone]    Medications or Discharge Instructions Refer to your discharge packet   or contact your [facility] Primary Care Provider   [phone]   Follow-up Appointment(s) Schedule your appointment via GadgetATM   or contact Scheduling [phone]   Billing Review your statement via GadgetATM  or contact Billing [phone]   Medical Records Review your records via GadgetATM   or contact Medical Records [phone]     You may receive a telephone call within two days of discharge. This call is to make certain you understand your discharge instructions and have the opportunity to have any questions answered. You can also easily access your medical information, test results and upcoming appointments via the GadgetATM free online health management tool. You can learn more and sign up at [employer]Medical/GadgetATM. For assistance setting up your GadgetATM account, please call [phone].    Once again, we want to ensure your discharge home is safe and that you have a clear understanding of any next steps in your care. If you have any questions or concerns, please do not hesitate to contact us, we are here for you. Thank you for choosing [facility] for your healthcare needs.    Sincerely,    Your [facility] Healthcare Team

## 2017-04-29 NOTE — ED AVS SNAPSHOT
Home Care Instructions                                                                                                                Laura Parks   MRN: [id number]    Department:  [facility], Emergency Dept   Date of Visit:  4/29/2017            [facility] Emergency Dept    [address] [facility]    [place] [address]    Phone:  [phone]      You were seen by     Raj Hart M.D.      Your Diagnosis Was     Thumb laceration, left, initial encounter     S61.012A       Follow-up Information     1. Please follow up.    Why:  GO DIRECTLY TO LABOR AND DELIVERY FOR EVALUATION      Medication Information     Review all of your home medications and newly ordered medications with your primary doctor and/or pharmacist as soon as possible. Follow medication instructions as directed by your doctor and/or pharmacist.     Please keep your complete medication list with you and share with your physician. Update the information when medications are discontinued, doses are changed, or new medications (including over-the-counter products) are added; and carry medication information at all times in the event of emergency situations.               Medication List      ASK your doctor about these medications        Instructions    Morning Afternoon Evening Bedtime    FOLIC ACID PO        Take  by mouth.                        PRENATAL 1 PO        Take  by mouth.                                Procedures and tests performed during your visit     DERMABOND        Discharge Instructions       Fingertip Laceration  The treatment of fingertip injuries depends on how large the cut is and whether the bone or nail tissue has been damaged. Amputations of the skin over the tip of the finger that is smaller than a dime (smaller than 1cm) will usually heal very well from the sides without any treatment other than cleaning the wound and changing the dressing.  Keep your hand elevated for the  next 2 to 3 days to reduce pain and swelling. A splint over the fingertip may be needed to protect your injury. If your cut is being allowed to heal in from the sides, you should soak it in warm water and change the dressing daily.   You may need a tetanus shot if:  · You cannot remember when you had your last tetanus shot.  · You have never had a tetanus shot.  · The injury broke your skin.  If you got a tetanus shot, your arm may swell, get red, and feel warm to the touch. This is common and not a problem. If you need a tetanus shot and you choose not to have one, there is a rare chance of getting tetanus. Sickness from tetanus can be serious.  SEEK MEDICAL CARE IF:   · There are any signs of infection: increased redness, swelling, and pain, or sometimes pus drainage.  Document Released: 01/25/2006 Document Revised: 03/11/2013 Document Reviewed: 01/21/2010  ExitCare® Patient Information ©2014 [employer].            Patient Information     Patient Information    Following emergency treatment: all patient requiring follow-up care must return either to a private physician or a clinic if your condition worsens before you are able to obtain further medical attention, please return to the emergency room.     Billing Information    [facility] we work to make the billing process streamlined for our patients.  Our Representatives are here to answer any questions you may have regarding your hospital bill.  If you have insurance coverage and have supplied your insurance information to us, we will submit a claim to your insurer on your behalf.  Should you have any questions regarding your bill, we can be reached online or by phone as follows:  Online: You are able pay your bills online or live chat with our representatives about any billing questions you may have. We are here to help Monday - Friday from 8:00am to 7:30pm and 9:00am - 12:00pm on Saturdays.  Please visit  [email or web address]  for more information.   Phone:  [phone] or [phone]    Please note that your emergency physician, surgeon, pathologist, radiologist, anesthesiologist, and other specialists are not employed by [facility] and will therefore bill separately for their services.  Please contact them directly for any questions concerning their bills at the numbers below:     Emergency Physician Services:  [phone]  [facility]:  [phone]  [facility]:  [phone]  [facility]:  [phone]    1. Your final bill may vary from the amount quoted upon discharge if all procedures are not complete at that time, or if your doctor has additional procedures of which we are not aware. You will receive an additional bill if you return to the Emergency Department at [facility] for suture removal regardless of the facility of which the sutures were placed.     2. Please arrange for settlement of this account at the emergency registration.    3. All self-pay accounts are due in full at the time of treatment.  If you are unable to meet this obligation then payment is expected within 4-5 days.     4. If you have had radiology studies (CT, X-ray, Ultrasound, MRI), you have received a preliminary result during your emergency department visit. Please contact the radiology department [phone] to receive a copy of your final result. Please discuss the Final result with your primary physician or with the follow up physician provided.     Crisis Hotline:  [place] Crisis Hotline:  9-404-LWPPTJE or 1-809.500.5628  [place] Crisis Hotline:    1-882.965.2621 or 865-858-7989         ED Discharge Follow Up Questions    1. In order to provide you with very good care, we would like to follow up with a phone call in the next few days.  May we have your permission to contact you?     YES /  NO    2. What is the best phone number to call  you? (       )_____-__________    3. What is the best time to call you?      Morning  /  Afternoon  /  Evening                   Patient Signature:  ____________________________________________________________    Date:  ____________________________________________________________      Your appointments     May 12, 2017 11:30 AM   OB Follow Up with STEPHANIA Alvarez   [facility] [address]  [facility] [address]   [phone]            May 18, 2017 11:30 AM   OB Follow Up with STEPHANIA Alvarez   [facility] [address]  [place] [address]   [phone]            May 26, 2017 11:30 AM   OB Follow Up with STEPHANIA Alvarez   [facility] [address]  [place] [address]   [phone]

## 2017-05-12 ENCOUNTER — ROUTINE PRENATAL (OUTPATIENT)
Dept: OBGYN | Facility: CLINIC | Age: 27
End: 2017-05-12
Payer: MEDICAID

## 2017-05-12 ENCOUNTER — HOSPITAL ENCOUNTER (OUTPATIENT)
Facility: MEDICAL CENTER | Age: 27
End: 2017-05-12
Attending: PHYSICIAN ASSISTANT
Payer: COMMERCIAL

## 2017-05-12 VITALS — DIASTOLIC BLOOD PRESSURE: 64 MMHG | WEIGHT: 203 LBS | BODY MASS INDEX: 30.77 KG/M2 | SYSTOLIC BLOOD PRESSURE: 100 MMHG

## 2017-05-12 DIAGNOSIS — Z34.01 SUPERVISION OF NORMAL FIRST PREGNANCY IN FIRST TRIMESTER: ICD-10-CM

## 2017-05-12 PROCEDURE — 90040 PR PRENATAL FOLLOW UP: CPT | Performed by: PHYSICIAN ASSISTANT

## 2017-05-12 NOTE — PROGRESS NOTES
Pt has no complaints with cramping, VB, LOF, though pt was seen in L&D for UCs, which pt is still having, though not as strong as before. +FM. GBS done today. Labor precautions stressed in detail. Daily FKC and walks recommended. Cervix: 1-2/75/-1, post, soft, vtx. RTC 1 wk or sooner prn.

## 2017-05-12 NOTE — PROGRESS NOTES
Pt. here for Ob f/u and GBS today. Good # [phone]  Good FM  Pt states having some lower abdominal pain.   Pt states went to [facility] on 4/29/17 for abdominal pain and low back pain.

## 2017-05-12 NOTE — MR AVS SNAPSHOT
Laura Parks   2017 11:30 AM   Routine Prenatal   MRN: [id number]    [facility]   Dept Phone:  [phone]    Description:  Female : 1990   Provider:  STEPHANIA Alvarez           Allergies as of 2017     No Known Allergies      You were diagnosed with     Supervision of normal first pregnancy in first trimester   [8602780]         Vital Signs     Blood Pressure Weight Smoking Status             100/64 mmHg 92.08 kg (203 lb) Never Smoker          Basic Information     Date Of Birth Sex Race Ethnicity Preferred Language    1990 Female White  Origin (East Timorese,Dominican,Jordanian,Milan, etc) English      Your appointments     May 18, 2017 11:30 AM   OB Follow Up with STEPHANIA Alvarez   [facility])    [address]  [place] [id number] [phone]            May 26, 2017 11:30 AM   OB Follow Up with STEPHANIA Alvarez   [facility] [address]  [place] [id number] [phone]              Problem List              ICD-10-CM Priority Class Noted - Resolved    Supervision of normal first pregnancy in first trimester Z34.01   2016 - Present      Health Maintenance        Date Due Completion Dates    IMM HEP B VACCINE (1 of 3 - Primary Series) 1990 ---    IMM HEP A VACCINE (1 of 2 - Standard Series) 12/3/1992 ---    IMM HPV VACCINE (1 of 3 - Female 3 Dose Series) 12/3/2002 ---    IMM VARICELLA (CHICKENPOX) VACCINE (1 of 2 - 2 Dose Adolescent Series) 12/3/2004 ---    PAP SMEAR 2019    IMM DTaP/Tdap/Td Vaccine (2 - Td) 2027            Current Immunizations     Tdap Vaccine 2017  1:51 PM      Below and/or attached are the medications your provider expects you to take. Review all of your home medications and newly ordered medications with your provider and/or pharmacist. Follow medication instructions as directed by your provider and/or  pharmacist. Please keep your medication list with you and share with your provider. Update the information when medications are discontinued, doses are changed, or new medications (including over-the-counter products) are added; and carry medication information at all times in the event of emergency situations     Allergies:  No Known Allergies          Medications  Valid as of: May 12, 2017 - 11:52 AM    Generic Name Brand Name Tablet Size Instructions for use    Folic Acid   Take  by mouth.        Prenatal MV-Min-Fe Fum-FA-DHA   Take  by mouth.        .                 Medicines prescribed today were sent to:     [address], [place] - [address]    [address] [place] [address]    Phone: [phone] Fax: [phone]    Open 24 Hours?: No      Medication refill instructions:       If your prescription bottle indicates you have medication refills left, it is not necessary to call your provider’s office. Please contact your pharmacy and they will refill your medication.    If your prescription bottle indicates you do not have any refills left, you may request refills at any time through one of the following ways: The online [employer] system (except Urgent Care), by calling your provider’s office, or by asking your pharmacy to contact your provider’s office with a refill request. Medication refills are processed only during regular business hours and may not be available until the next business day. Your provider may request additional information or to have a follow-up visit with you prior to refilling your medication.   *Please Note: Medication refills are assigned a new Rx number when refilled electronically. Your pharmacy may indicate that no refills were authorized even though a new prescription for the same medication is available at the pharmacy. Please request the medicine by name with the pharmacy before contacting your provider for a refill.        Your To Do List     Future Labs/Procedures  Complete By Expires    GRP B STREP, BY PCR (GRAY BROTH)  As directed 5/13/2018    Comments:    Source : VAGINAL/ RECTAL      Other Notes About Your Plan     BOY - Elan Gipson           MyChart Access Code: Activation code not generated  Current MyChart Status: Active

## 2017-05-13 DIAGNOSIS — Z34.01 SUPERVISION OF NORMAL FIRST PREGNANCY IN FIRST TRIMESTER: ICD-10-CM

## 2017-05-14 LAB — GP B STREP DNA SPEC QL NAA+PROBE: POSITIVE

## 2017-05-15 PROBLEM — O99.820 GBS (GROUP B STREPTOCOCCUS CARRIER), +RV CULTURE, CURRENTLY PREGNANT: Status: ACTIVE | Noted: 2017-05-15

## 2017-05-18 ENCOUNTER — ROUTINE PRENATAL (OUTPATIENT)
Dept: OBGYN | Facility: CLINIC | Age: 27
End: 2017-05-18
Payer: MEDICAID

## 2017-05-18 VITALS — WEIGHT: 207 LBS | BODY MASS INDEX: 31.37 KG/M2 | DIASTOLIC BLOOD PRESSURE: 80 MMHG | SYSTOLIC BLOOD PRESSURE: 120 MMHG

## 2017-05-18 DIAGNOSIS — Z34.01 SUPERVISION OF NORMAL FIRST PREGNANCY IN FIRST TRIMESTER: ICD-10-CM

## 2017-05-18 DIAGNOSIS — O99.820 GBS (GROUP B STREPTOCOCCUS CARRIER), +RV CULTURE, CURRENTLY PREGNANT: ICD-10-CM

## 2017-05-18 PROCEDURE — 90040 PR PRENATAL FOLLOW UP: CPT | Performed by: PHYSICIAN ASSISTANT

## 2017-05-18 NOTE — PROGRESS NOTES
Pt has no complaints with cramping, strong UCs, VB, LOF. +FM. GBS pos - pt notified of results. Labor precautions reviewed. Daily FKC recommended. RTC 1 wk or sooner prn.

## 2017-05-18 NOTE — MR AVS SNAPSHOT
Laura Parks   2017 11:30 AM   Routine Prenatal   MRN: [id number]    [facility]   Dept Phone:  [phone]    Description:  Female : 1990   Provider:  STEPHANIA Alvarez           Allergies as of 2017     No Known Allergies      You were diagnosed with     GBS (group B Streptococcus carrier), +RV culture, currently pregnant   [[id number]]       Supervision of normal first pregnancy in first trimester   [[id number]]         Vital Signs     Blood Pressure Weight Smoking Status             120/80 mmHg 93.895 kg (207 lb) Never Smoker          Basic Information     Date Of Birth Sex Race Ethnicity Preferred Language    1990 Female White  Origin (Cymro,South Sudanese,Latvian,Milan, etc) English      Your appointments     May 26, 2017 11:30 AM   OB Follow Up with STEPHANIA Alvarez   [facility] [address]  [facility] [id number] [phone]              Problem List              ICD-10-CM Priority Class Noted - Resolved    Supervision of normal first pregnancy in first trimester Z34.01   2016 - Present    GBS (group B Streptococcus carrier), +RV culture, currently pregnant O99.820   5/15/2017 - Present      Health Maintenance        Date Due Completion Dates    IMM HEP B VACCINE (1 of 3 - Primary Series) 1990 ---    IMM HEP A VACCINE (1 of 2 - Standard Series) 12/3/1992 ---    IMM HPV VACCINE (1 of 3 - Female 3 Dose Series) 12/3/2002 ---    IMM VARICELLA (CHICKENPOX) VACCINE (1 of 2 - 2 Dose Adolescent Series) 12/3/2004 ---    PAP SMEAR 2019    IMM DTaP/Tdap/Td Vaccine (2 - Td) 2027            Current Immunizations     Tdap Vaccine 2017  1:51 PM      Below and/or attached are the medications your provider expects you to take. Review all of your home medications and newly ordered medications with your provider and/or pharmacist. Follow medication instructions as directed by your  provider and/or pharmacist. Please keep your medication list with you and share with your provider. Update the information when medications are discontinued, doses are changed, or new medications (including over-the-counter products) are added; and carry medication information at all times in the event of emergency situations     Allergies:  No Known Allergies          Medications  Valid as of: May 18, 2017 - 12:36 PM    Generic Name Brand Name Tablet Size Instructions for use    Folic Acid   Take  by mouth.        Prenatal MV-Min-Fe Fum-FA-DHA   Take  by mouth.        .                 Medicines prescribed today were sent to:     [facility] [address] [place] [address], [place] - [address]    [address] [place] [address]    Phone: [phone] Fax: [phone]    Open 24 Hours?: No      Medication refill instructions:       If your prescription bottle indicates you have medication refills left, it is not necessary to call your provider’s office. Please contact your pharmacy and they will refill your medication.    If your prescription bottle indicates you do not have any refills left, you may request refills at any time through one of the following ways: The online [employer] system (except Urgent Care), by calling your provider’s office, or by asking your pharmacy to contact your provider’s office with a refill request. Medication refills are processed only during regular business hours and may not be available until the next business day. Your provider may request additional information or to have a follow-up visit with you prior to refilling your medication.   *Please Note: Medication refills are assigned a new Rx number when refilled electronically. Your pharmacy may indicate that no refills were authorized even though a new prescription for the same medication is available at the pharmacy. Please request the medicine by name with the pharmacy before contacting your provider for a refill.        Other Notes About Your Plan       SONYA Gipson           MyChart Access Code: Activation code not generated  Current [employer] Status: Active

## 2017-05-18 NOTE — PROGRESS NOTES
Pt here for OB/FU Reports Good Fetal Movement. States the movements are getting slower   GBS Positive   Pt c/o pelvic pain     # [phone]

## 2017-05-22 ENCOUNTER — HOSPITAL ENCOUNTER (OUTPATIENT)
Facility: MEDICAL CENTER | Age: 27
End: 2017-05-22
Attending: OBSTETRICS & GYNECOLOGY | Admitting: OBSTETRICS & GYNECOLOGY
Payer: MEDICAID

## 2017-05-22 PROCEDURE — 59025 FETAL NON-STRESS TEST: CPT | Performed by: OBSTETRICS & GYNECOLOGY

## 2017-05-23 NOTE — PROGRESS NOTES
Patient came in for decreased fetal movement.she hasn't felt the baby move as much today.she is due 6-14 which makes her 36.5weeks.EFM applied and POC discussed.she has had no bleeding or leaking of fluid.1730 baby is moving well and patient can feel it.Dr Salinas informed and patient discharged virgil,maryam in stable condition with fob-she will F/U with [facility] on Friday.

## 2017-05-26 ENCOUNTER — ROUTINE PRENATAL (OUTPATIENT)
Dept: OBGYN | Facility: CLINIC | Age: 27
End: 2017-05-26
Payer: MEDICAID

## 2017-05-26 VITALS — DIASTOLIC BLOOD PRESSURE: 70 MMHG | SYSTOLIC BLOOD PRESSURE: 118 MMHG | BODY MASS INDEX: 32.28 KG/M2 | WEIGHT: 213 LBS

## 2017-05-26 DIAGNOSIS — O99.820 GBS (GROUP B STREPTOCOCCUS CARRIER), +RV CULTURE, CURRENTLY PREGNANT: ICD-10-CM

## 2017-05-26 DIAGNOSIS — Z34.01 SUPERVISION OF NORMAL FIRST PREGNANCY IN FIRST TRIMESTER: ICD-10-CM

## 2017-05-26 PROCEDURE — 90040 PR PRENATAL FOLLOW UP: CPT | Performed by: PHYSICIAN ASSISTANT

## 2017-05-26 NOTE — PROGRESS NOTES
Pt has no complaints with cramping, regular UCs, Vb, LOF, though pt has had incr pressure only. Pt was seen in L&D for decr FM but sent home, doing better now. +FM. Labor precautions reviewed. Daily FKC recommended. Pt wants cervical exam today. Cervix: 1-2/50/-3, mid, soft, vtx. RTC 1 wk or sooner prn.

## 2017-05-26 NOTE — PROGRESS NOTES
Pt here for OB/FU Reports Good Fetal Movement.  GBS Positive   Pt states no complications today.   # [phone]

## 2017-05-26 NOTE — MR AVS SNAPSHOT
Lauraopal Parks   2017 11:30 AM   Routine Prenatal   MRN: [id number]    [facility]   Dept Phone:  [phone]    Description:  Female : 1990   Provider:  STEPHANIA Alvarez           Allergies as of 2017     No Known Allergies      You were diagnosed with     GBS (group B Streptococcus carrier), +RV culture, currently pregnant   [[id number]]       Supervision of normal first pregnancy in first trimester   [[id number]]         Vital Signs     Blood Pressure Weight Smoking Status             118/70 mmHg 96.616 kg (213 lb) Never Smoker          Basic Information     Date Of Birth Sex Race Ethnicity Preferred Language    1990 Female White  Origin (Tristanian,Brazilian,Kittitian,Milan, etc) English      Your appointments     2017 10:30 AM   OB Follow Up with STEPHANIA Alvarez   [facility] [address]  [facility] [id number] [phone]              Problem List              ICD-10-CM Priority Class Noted - Resolved    Supervision of normal first pregnancy in first trimester Z34.01   2016 - Present    GBS (group B Streptococcus carrier), +RV culture, currently pregnant O99.820   5/15/2017 - Present      Health Maintenance        Date Due Completion Dates    IMM HEP B VACCINE (1 of 3 - Primary Series) 1990 ---    IMM HEP A VACCINE (1 of 2 - Standard Series) 12/3/1992 ---    IMM HPV VACCINE (1 of 3 - Female 3 Dose Series) 12/3/2002 ---    IMM VARICELLA (CHICKENPOX) VACCINE (1 of 2 - 2 Dose Adolescent Series) 12/3/2004 ---    PAP SMEAR 2019    IMM DTaP/Tdap/Td Vaccine (2 - Td) 2027            Current Immunizations     Tdap Vaccine 2017  1:51 PM      Below and/or attached are the medications your provider expects you to take. Review all of your home medications and newly ordered medications with your provider and/or pharmacist. Follow medication instructions as directed by your  provider and/or pharmacist. Please keep your medication list with you and share with your provider. Update the information when medications are discontinued, doses are changed, or new medications (including over-the-counter products) are added; and carry medication information at all times in the event of emergency situations     Allergies:  No Known Allergies          Medications  Valid as of: May 26, 2017 - 11:35 AM    Generic Name Brand Name Tablet Size Instructions for use    Folic Acid   Take  by mouth.        Prenatal MV-Min-Fe Fum-FA-DHA   Take  by mouth.        .                 Medicines prescribed today were sent to:     [facility] [address] [place] [address], [place] - [address]    [address] [place] [address]    Phone: [phone] Fax: [phone]    Open 24 Hours?: No      Medication refill instructions:       If your prescription bottle indicates you have medication refills left, it is not necessary to call your provider’s office. Please contact your pharmacy and they will refill your medication.    If your prescription bottle indicates you do not have any refills left, you may request refills at any time through one of the following ways: The online [employer] system (except Urgent Care), by calling your provider’s office, or by asking your pharmacy to contact your provider’s office with a refill request. Medication refills are processed only during regular business hours and may not be available until the next business day. Your provider may request additional information or to have a follow-up visit with you prior to refilling your medication.   *Please Note: Medication refills are assigned a new Rx number when refilled electronically. Your pharmacy may indicate that no refills were authorized even though a new prescription for the same medication is available at the pharmacy. Please request the medicine by name with the pharmacy before contacting your provider for a refill.        Other Notes About Your Plan       SONYA Gipson           MyChart Access Code: Activation code not generated  Current [employer]art Status: Active

## 2017-05-27 ENCOUNTER — HOSPITAL ENCOUNTER (INPATIENT)
Facility: MEDICAL CENTER | Age: 27
LOS: 4 days | End: 2017-05-31
Attending: OBSTETRICS & GYNECOLOGY | Admitting: OBSTETRICS & GYNECOLOGY
Payer: MEDICAID

## 2017-05-27 LAB
A1 MICROGLOB PLACENTAL VAG QL: POSITIVE
BASOPHILS # BLD AUTO: 0.3 % (ref 0–1.8)
BASOPHILS # BLD: 0.04 K/UL (ref 0–0.12)
EOSINOPHIL # BLD AUTO: 0.09 K/UL (ref 0–0.51)
EOSINOPHIL NFR BLD: 0.7 % (ref 0–6.9)
ERYTHROCYTE [DISTWIDTH] IN BLOOD BY AUTOMATED COUNT: 44 FL (ref 35.9–50)
HCT VFR BLD AUTO: 40.5 % (ref 37–47)
HGB BLD-MCNC: 13.5 G/DL (ref 12–16)
HOLDING TUBE BB 8507: NORMAL
IMM GRANULOCYTES # BLD AUTO: 0.11 K/UL (ref 0–0.11)
IMM GRANULOCYTES NFR BLD AUTO: 0.9 % (ref 0–0.9)
LYMPHOCYTES # BLD AUTO: 2.07 K/UL (ref 1–4.8)
LYMPHOCYTES NFR BLD: 16.2 % (ref 22–41)
MCH RBC QN AUTO: 29.3 PG (ref 27–33)
MCHC RBC AUTO-ENTMCNC: 33.3 G/DL (ref 33.6–35)
MCV RBC AUTO: 88 FL (ref 81.4–97.8)
MONOCYTES # BLD AUTO: 1.14 K/UL (ref 0–0.85)
MONOCYTES NFR BLD AUTO: 8.9 % (ref 0–13.4)
NEUTROPHILS # BLD AUTO: 9.31 K/UL (ref 2–7.15)
NEUTROPHILS NFR BLD: 73 % (ref 44–72)
NRBC # BLD AUTO: 0 K/UL
NRBC BLD AUTO-RTO: 0 /100 WBC
PLATELET # BLD AUTO: 207 K/UL (ref 164–446)
PMV BLD AUTO: 10.8 FL (ref 9–12.9)
RBC # BLD AUTO: 4.6 M/UL (ref 4.2–5.4)
WBC # BLD AUTO: 12.8 K/UL (ref 4.8–10.8)

## 2017-05-27 PROCEDURE — 85025 COMPLETE CBC W/AUTO DIFF WBC: CPT

## 2017-05-27 PROCEDURE — 700105 HCHG RX REV CODE 258: Performed by: OBSTETRICS & GYNECOLOGY

## 2017-05-27 PROCEDURE — 700111 HCHG RX REV CODE 636 W/ 250 OVERRIDE (IP)

## 2017-05-27 PROCEDURE — 84112 EVAL AMNIOTIC FLUID PROTEIN: CPT

## 2017-05-27 PROCEDURE — 770002 HCHG ROOM/CARE - OB PRIVATE (112)

## 2017-05-27 PROCEDURE — 700105 HCHG RX REV CODE 258: Performed by: NURSE PRACTITIONER

## 2017-05-27 PROCEDURE — 80307 DRUG TEST PRSMV CHEM ANLYZR: CPT

## 2017-05-27 PROCEDURE — 700111 HCHG RX REV CODE 636 W/ 250 OVERRIDE (IP): Performed by: NURSE PRACTITIONER

## 2017-05-27 RX ORDER — MISOPROSTOL 200 UG/1
800 TABLET ORAL
Status: DISCONTINUED | OUTPATIENT
Start: 2017-05-27 | End: 2017-05-28 | Stop reason: HOSPADM

## 2017-05-27 RX ORDER — OXYCODONE HYDROCHLORIDE AND ACETAMINOPHEN 5; 325 MG/1; MG/1
2 TABLET ORAL EVERY 4 HOURS PRN
Status: CANCELLED | OUTPATIENT
Start: 2017-05-27

## 2017-05-27 RX ORDER — SODIUM CHLORIDE, SODIUM LACTATE, POTASSIUM CHLORIDE, CALCIUM CHLORIDE 600; 310; 30; 20 MG/100ML; MG/100ML; MG/100ML; MG/100ML
INJECTION, SOLUTION INTRAVENOUS CONTINUOUS
Status: DISCONTINUED | OUTPATIENT
Start: 2017-05-27 | End: 2017-05-28 | Stop reason: HOSPADM

## 2017-05-27 RX ORDER — PENICILLIN G POTASSIUM 5000000 [IU]/1
5 INJECTION, POWDER, FOR SOLUTION INTRAMUSCULAR; INTRAVENOUS ONCE
Status: COMPLETED | OUTPATIENT
Start: 2017-05-27 | End: 2017-05-27

## 2017-05-27 RX ORDER — ROPIVACAINE HYDROCHLORIDE 2 MG/ML
INJECTION, SOLUTION EPIDURAL; INFILTRATION; PERINEURAL
Status: COMPLETED
Start: 2017-05-27 | End: 2017-05-27

## 2017-05-27 RX ORDER — ONDANSETRON 4 MG/1
4 TABLET, ORALLY DISINTEGRATING ORAL EVERY 6 HOURS PRN
Status: DISCONTINUED | OUTPATIENT
Start: 2017-05-27 | End: 2017-05-28 | Stop reason: ALTCHOICE

## 2017-05-27 RX ORDER — DEXTROSE, SODIUM CHLORIDE, SODIUM LACTATE, POTASSIUM CHLORIDE, AND CALCIUM CHLORIDE 5; .6; .31; .03; .02 G/100ML; G/100ML; G/100ML; G/100ML; G/100ML
INJECTION, SOLUTION INTRAVENOUS CONTINUOUS
Status: DISCONTINUED | OUTPATIENT
Start: 2017-05-27 | End: 2017-05-28 | Stop reason: HOSPADM

## 2017-05-27 RX ORDER — OXYCODONE HYDROCHLORIDE AND ACETAMINOPHEN 5; 325 MG/1; MG/1
1 TABLET ORAL EVERY 4 HOURS PRN
Status: CANCELLED | OUTPATIENT
Start: 2017-05-27

## 2017-05-27 RX ORDER — IBUPROFEN 600 MG/1
600 TABLET ORAL EVERY 6 HOURS PRN
Status: CANCELLED | OUTPATIENT
Start: 2017-05-27

## 2017-05-27 RX ORDER — TERBUTALINE SULFATE 1 MG/ML
INJECTION, SOLUTION SUBCUTANEOUS
Status: COMPLETED
Start: 2017-05-27 | End: 2017-05-27

## 2017-05-27 RX ORDER — SODIUM CHLORIDE, SODIUM LACTATE, POTASSIUM CHLORIDE, CALCIUM CHLORIDE 600; 310; 30; 20 MG/100ML; MG/100ML; MG/100ML; MG/100ML
1500 INJECTION, SOLUTION INTRAVENOUS ONCE
Status: COMPLETED | OUTPATIENT
Start: 2017-05-28 | End: 2017-05-28

## 2017-05-27 RX ORDER — ONDANSETRON 2 MG/ML
4 INJECTION INTRAMUSCULAR; INTRAVENOUS EVERY 6 HOURS PRN
Status: DISCONTINUED | OUTPATIENT
Start: 2017-05-27 | End: 2017-05-28 | Stop reason: ALTCHOICE

## 2017-05-27 RX ORDER — METOCLOPRAMIDE HYDROCHLORIDE 5 MG/ML
10 INJECTION INTRAMUSCULAR; INTRAVENOUS ONCE
Status: COMPLETED | OUTPATIENT
Start: 2017-05-28 | End: 2017-05-28

## 2017-05-27 RX ORDER — ALUMINA, MAGNESIA, AND SIMETHICONE 2400; 2400; 240 MG/30ML; MG/30ML; MG/30ML
30 SUSPENSION ORAL EVERY 6 HOURS PRN
Status: DISCONTINUED | OUTPATIENT
Start: 2017-05-27 | End: 2017-05-28 | Stop reason: HOSPADM

## 2017-05-27 RX ORDER — BUPIVACAINE HYDROCHLORIDE 2.5 MG/ML
INJECTION, SOLUTION EPIDURAL; INFILTRATION; INTRACAUDAL
Status: ACTIVE
Start: 2017-05-27 | End: 2017-05-28

## 2017-05-27 RX ADMIN — OXYTOCIN 2 MILLI-UNITS/MIN: 10 INJECTION, SOLUTION INTRAMUSCULAR; INTRAVENOUS at 08:22

## 2017-05-27 RX ADMIN — SODIUM CHLORIDE 2.5 MILLION UNITS: 9 INJECTION, SOLUTION INTRAVENOUS at 11:11

## 2017-05-27 RX ADMIN — SODIUM CHLORIDE, POTASSIUM CHLORIDE, SODIUM LACTATE AND CALCIUM CHLORIDE: 600; 310; 30; 20 INJECTION, SOLUTION INTRAVENOUS at 14:42

## 2017-05-27 RX ADMIN — FENTANYL CITRATE 100 MCG: 50 INJECTION, SOLUTION INTRAMUSCULAR; INTRAVENOUS at 15:56

## 2017-05-27 RX ADMIN — FENTANYL CITRATE 100 MCG: 50 INJECTION, SOLUTION INTRAMUSCULAR; INTRAVENOUS at 19:30

## 2017-05-27 RX ADMIN — SODIUM CHLORIDE, POTASSIUM CHLORIDE, SODIUM LACTATE AND CALCIUM CHLORIDE: 600; 310; 30; 20 INJECTION, SOLUTION INTRAVENOUS at 22:08

## 2017-05-27 RX ADMIN — SODIUM CHLORIDE, POTASSIUM CHLORIDE, SODIUM LACTATE AND CALCIUM CHLORIDE: 600; 310; 30; 20 INJECTION, SOLUTION INTRAVENOUS at 20:32

## 2017-05-27 RX ADMIN — SODIUM CHLORIDE, POTASSIUM CHLORIDE, SODIUM LACTATE AND CALCIUM CHLORIDE: 600; 310; 30; 20 INJECTION, SOLUTION INTRAVENOUS at 20:57

## 2017-05-27 RX ADMIN — TERBUTALINE SULFATE 0.25 MG: 1 INJECTION, SOLUTION SUBCUTANEOUS at 22:30

## 2017-05-27 RX ADMIN — FENTANYL CITRATE 100 MCG: 50 INJECTION, SOLUTION INTRAMUSCULAR; INTRAVENOUS at 17:00

## 2017-05-27 RX ADMIN — FENTANYL CITRATE 100 MCG: 50 INJECTION, SOLUTION INTRAMUSCULAR; INTRAVENOUS at 13:06

## 2017-05-27 RX ADMIN — SODIUM CHLORIDE 2.5 MILLION UNITS: 9 INJECTION, SOLUTION INTRAVENOUS at 17:31

## 2017-05-27 RX ADMIN — ROPIVACAINE HYDROCHLORIDE 200 MG: 2 INJECTION, SOLUTION EPIDURAL; INFILTRATION at 20:04

## 2017-05-27 RX ADMIN — FENTANYL CITRATE 100 MCG: 50 INJECTION, SOLUTION INTRAMUSCULAR; INTRAVENOUS at 14:11

## 2017-05-27 RX ADMIN — SODIUM CHLORIDE, SODIUM LACTATE, POTASSIUM CHLORIDE, CALCIUM CHLORIDE AND DEXTROSE MONOHYDRATE: 5; 600; 310; 30; 20 INJECTION, SOLUTION INTRAVENOUS at 21:30

## 2017-05-27 RX ADMIN — SODIUM CHLORIDE 2.5 MILLION UNITS: 9 INJECTION, SOLUTION INTRAVENOUS at 22:08

## 2017-05-27 RX ADMIN — SODIUM CHLORIDE, POTASSIUM CHLORIDE, SODIUM LACTATE AND CALCIUM CHLORIDE: 600; 310; 30; 20 INJECTION, SOLUTION INTRAVENOUS at 06:00

## 2017-05-27 RX ADMIN — PENICILLIN G POTASSIUM 5 MILLION UNITS: 5000000 POWDER, FOR SOLUTION INTRAMUSCULAR; INTRAPLEURAL; INTRATHECAL; INTRAVENOUS at 07:13

## 2017-05-27 ASSESSMENT — LIFESTYLE VARIABLES
DO YOU DRINK ALCOHOL: NO
EVER_SMOKED: NEVER
ALCOHOL_USE: NO

## 2017-05-27 ASSESSMENT — COPD QUESTIONNAIRES
COPD SCREENING SCORE: 0
DURING THE PAST 4 WEEKS HOW MUCH DID YOU FEEL SHORT OF BREATH: NONE/LITTLE OF THE TIME
HAVE YOU SMOKED AT LEAST 100 CIGARETTES IN YOUR ENTIRE LIFE: NO/DON'T KNOW
DO YOU EVER COUGH UP ANY MUCUS OR PHLEGM?: NO/ONLY WITH OCCASIONAL COLDS OR INFECTIONS

## 2017-05-27 ASSESSMENT — PATIENT HEALTH QUESTIONNAIRE - PHQ9
SUM OF ALL RESPONSES TO PHQ QUESTIONS 1-9: 0
1. LITTLE INTEREST OR PLEASURE IN DOING THINGS: NOT AT ALL
2. FEELING DOWN, DEPRESSED, IRRITABLE, OR HOPELESS: NOT AT ALL
SUM OF ALL RESPONSES TO PHQ9 QUESTIONS 1 AND 2: 0

## 2017-05-27 NOTE — CARE PLAN
Problem: Pain  Goal: Alleviation of Pain or a reduction in pain to the patient’s comfort goal  Outcome: PROGRESSING AS EXPECTED  Pt educated on options for pain management during labor and postpartum  Intervention: Pain Management - Epidural/Spinal  Pt wishes to have an natural childbirth

## 2017-05-27 NOTE — PROGRESS NOTES
"[facility] LABOR AND DELIVERY PROGRESS NOTE    PATIENT ID:  NAME:  Laura Parks  MRN:               [id number]  YOB: 1990     26 y.o. female  at 37w3d.    Subjective: Doing well, uncomfortable with contracts but does not desire epidural    Objective:    Filed Vitals:    17 0500 17 0741 17 1044   BP: 125/84 132/74 108/63   Pulse: 67 73 70   Temp: 36.6 °C (97.9 °F) 36.6 °C (97.8 °F) 36.4 °C (97.6 °F)   TempSrc: Temporal     Height: 1.727 m (5' 8\")     Weight: 96.616 kg (213 lb)         Cervix:  5cm/100%/0  Ubly: Uterine Contractions Q2-3 minutes.   FHRM: Baseline 130s, Accels, some variable decels, moderate variability  Pitocin: running, currently at 6 mU/min  Pain control: adequate, will given fentanyl prn    Assessment: 26 y.o. female    at 37w3d.    Plan:   1. Continue current management  2. Anticipate NVD delivery  3. GBS positive-PNC infusing  4. AROM 14:10, clear fluid     Primitivo Torres M.D.    "

## 2017-05-27 NOTE — PROGRESS NOTES
0455  EDC  37w3d. Pt presents to L&D with complaints of possible SROM. PT stated that she got up to go to the bathroom and noticed increased discharge on her shorts. Pt stated she went to the bathroom and noticed some mucous like discharge with blood. PT reports +FM. TOCO and US applied, VSS.     0515 Orders received to collect amnisure and SVE. Amnisure collected, SVE /-2.     0545 Positive results received. VIKY Redd updated on results, orders to admit pt received.     0700 Report given to Thu YEUNG, POC discussed

## 2017-05-27 NOTE — H&P
"History and Physical      Laura Parks is a 26 y.o. female  at 37w3d who presents for contractions and SROM with positive amnisure at approx 0400.     Subjective:   positive  For CTXS.   positive Feels pain   positive for LOF  negative for vaginal bleeding.   positive for fetal movement    ROS: Pertinent items are noted in HPI.    History reviewed. No pertinent past medical history.  Past Surgical History   Procedure Laterality Date   • Other orthopedic surgery       L elbow surgery after fracture     OB History    Para Term  AB SAB TAB Ectopic Multiple Living   1               # Outcome Date GA Lbr Philip/2nd Weight Sex Delivery Anes PTL Lv   1 Current                 Social History     Social History   • Marital Status: Single     Spouse Name: N/A   • Number of Children: N/A   • Years of Education: N/A     Occupational History   • Not on file.     Social History Main Topics   • Smoking status: Never Smoker    • Smokeless tobacco: Not on file   • Alcohol Use: No      Comment: occ   • Drug Use: No   • Sexual Activity:     Partners: Male      Comment: none      Other Topics Concern   • Not on file     Social History Narrative     Allergies: Review of patient's allergies indicates no known allergies.  No current facility-administered medications for this encounter.    Prenatal care with [facility] starting at 9 2/7 week gestation with following problems:  Patient Active Problem List    Diagnosis Date Noted   • GBS (group B Streptococcus carrier), +RV culture, currently pregnant 05/15/2017   • Supervision of normal first pregnancy in first trimester 2016           Objective:      Blood pressure 125/84, pulse 67, temperature 36.6 °C (97.9 °F), temperature source Temporal, height 1.727 m (5' 8\"), weight 96.616 kg (213 lb).    General:   no acute distress   Skin:   normal   HEENT:  PERRLA   Lungs:   CTA bilateral   Heart:   S1, S2 normal, no murmur, click, rub or gallop, regular rate and " rhythm, S1, S2 normal, brisk carotid upstroke without bruits, peripheral pulses very brisk, chest is clear without rales or wheezing, no pedal edema, no JVD, no hepatosplenomegaly   Abdomen:   gravid, NT   EFW:  3400   Pelvis:  adequate with gynecoid pelvis   FHT:  135 BPM   Uterine Size: S=D   Presentations: Cephalic   Cervix:     Dilation: 3cm    Effacement: 50%    Station:  -2    Consistency: Soft    Position: Anterior     Lab Review  Lab:   Blood type: O     Recent Results (from the past 5880 hour(s))   POCT Pregnancy    Collection Time: 10/24/16 11:58 AM   Result Value Ref Range    POC Urine Pregnancy Test Positive Negative    Internal Control Positive Positive     Internal Control Negative Negative    CBC WITH DIFFERENTIAL    Collection Time: 10/29/16  8:36 PM   Result Value Ref Range    WBC 13.2 (H) 4.8 - 10.8 K/uL    RBC 4.24 4.20 - 5.40 M/uL    Hemoglobin 12.5 12.0 - 16.0 g/dL    Hematocrit 37.6 37.0 - 47.0 %    MCV 88.7 81.4 - 97.8 fL    MCH 29.5 27.0 - 33.0 pg    MCHC 33.2 (L) 33.6 - 35.0 g/dL    RDW 42.0 35.9 - 50.0 fL    Platelet Count 241 164 - 446 K/uL    MPV 9.5 9.0 - 12.9 fL    Neutrophils-Polys 65.30 44.00 - 72.00 %    Lymphocytes 22.20 22.00 - 41.00 %    Monocytes 9.90 0.00 - 13.40 %    Eosinophils 1.30 0.00 - 6.90 %    Basophils 0.50 0.00 - 1.80 %    Immature Granulocytes 0.80 0.00 - 0.90 %    Nucleated RBC 0.00 /100 WBC    Neutrophils (Absolute) 8.58 (H) 2.00 - 7.15 K/uL    Lymphs (Absolute) 2.93 1.00 - 4.80 K/uL    Monos (Absolute) 1.31 (H) 0.00 - 0.85 K/uL    Eos (Absolute) 0.17 0.00 - 0.51 K/uL    Baso (Absolute) 0.07 0.00 - 0.12 K/uL    Immature Granulocytes (abs) 0.11 0.00 - 0.11 K/uL    NRBC (Absolute) 0.00 K/uL   COMP METABOLIC PANEL    Collection Time: 10/29/16  8:36 PM   Result Value Ref Range    Sodium 137 135 - 145 mmol/L    Potassium 3.4 (L) 3.6 - 5.5 mmol/L    Chloride 104 96 - 112 mmol/L    Co2 25 20 - 33 mmol/L    Anion Gap 8.0 0.0 - 11.9    Glucose 78 65 - 99 mg/dL    Bun 20 8 -  22 mg/dL    Creatinine 0.58 0.50 - 1.40 mg/dL    Calcium 9.2 8.5 - 10.5 mg/dL    AST(SGOT) 20 12 - 45 U/L    ALT(SGPT) 30 2 - 50 U/L    Alkaline Phosphatase 69 30 - 99 U/L    Total Bilirubin 0.2 0.1 - 1.5 mg/dL    Albumin 3.8 3.2 - 4.9 g/dL    Total Protein 6.6 6.0 - 8.2 g/dL    Globulin 2.8 1.9 - 3.5 g/dL    A-G Ratio 1.4 g/dL   HCG QUANTITATIVE    Collection Time: 10/29/16  8:36 PM   Result Value Ref Range    Bhcg 23276.3 (H) 0.0 - 5.0 mIU/mL   RH TYPE FOR RHOGAM FROM E.D.    Collection Time: 10/29/16  8:36 PM   Result Value Ref Range    Emergency Department Rh Typing POS     Number Of Rh Doses Indicated ZERO    ESTIMATED GFR    Collection Time: 10/29/16  8:36 PM   Result Value Ref Range    GFR If African American >60 >60 mL/min/1.73 m 2    GFR If Non African American >60 >60 mL/min/1.73 m 2   URINALYSIS,CULTURE IF INDICATED    Collection Time: 10/29/16  8:36 PM   Result Value Ref Range    Micro Urine Req Microscopic     Color Lt. Yellow     Character Sl Cloudy (A)     Specific Gravity 1.021 <1.035    Ph 5.5 5.0-8.0    Glucose Negative Negative mg/dL    Ketones Negative Negative mg/dL    Protein Negative Negative mg/dL    Bilirubin Negative Negative    Nitrite Positive (A) Negative    Leukocyte Esterase Trace (A) Negative    Occult Blood Negative Negative    Culture Indicated Yes UA Culture   URINE MICROSCOPIC (W/UA)    Collection Time: 10/29/16  8:36 PM   Result Value Ref Range    WBC 2-5 /hpf    RBC 0-2 /hpf    Bacteria Few (A) None /hpf    Epithelial Cells Few /hpf    Mucous Threads Few /hpf    Ca Oxalate Crystal Few /hpf   URINE CULTURE(NEW)    Collection Time: 10/29/16  8:36 PM   Result Value Ref Range    Significant Indicator POS (POS)     Source UR     Site      Urine Culture (A)     Urine Culture Klebsiella pneumoniae  >100,000 cfu/mL   (A)        Susceptibility    Klebsiella pneumoniae -  (no method available)     Ceftazidime <=1 Sensitive mcg/mL     Cephalothin <=8 Sensitive mcg/mL     Cefotaxime <=2  Sensitive mcg/mL     Ciprofloxacin <=1 Sensitive mcg/mL     Cefepime <=8 Sensitive mcg/mL     Cefuroxime <=4 Sensitive mcg/mL     Cefotetan <=16 Sensitive mcg/mL     Ceftriaxone <=8 Sensitive mcg/mL     Nitrofurantoin >64 Resistant mcg/mL     Tobramycin <=4 Sensitive mcg/mL     Gentamicin <=4 Sensitive mcg/mL     Levofloxacin <=2 Sensitive mcg/mL     Pip/Tazobactam <=16 Sensitive mcg/mL     Piperacillin <=16 Sensitive mcg/mL     Trimeth/Sulfa <=2/38 Sensitive mcg/mL     Tigecycline <=2 Sensitive mcg/mL   POCT Urinalysis    Collection Time: 12/08/16  8:20 AM   Result Value Ref Range    POC Color  Negative    POC Appearance  Negative    POC Leukocyte Esterase neg Negative    POC Nitrites neg Negative    POC Urobiligen  Negative (0.2) mg/dL    POC Protein neg Negative mg/dL    POC Urine PH 5.0 5.0 - 8.0    POC Blood small Negative    POC Specific Gravity 1.010 <1.005 - >1.030    POC Ketones neg Negative mg/dL    POC Biliruben  Negative mg/dL    POC Glucose neg Negative mg/dL   THINPREP RFLX HPV ASCUS W/CTNG    Collection Time: 12/08/16  8:42 AM   Result Value Ref Range    Cytology Reg See Path Report     Source Cervical     C. trachomatis by PCR Negative Negative    N. gonorrhoeae by PCR Negative Negative   PREG CNTR PRENATAL PN    Collection Time: 01/03/17 10:45 AM   Result Value Ref Range    WBC 9.9 4.8 - 10.8 K/uL    RBC 4.46 4.20 - 5.40 M/uL    Hemoglobin 13.4 12.0 - 16.0 g/dL    Hematocrit 38.8 37.0 - 47.0 %    MCV 87.0 81.4 - 97.8 fL    MCH 30.0 27.0 - 33.0 pg    MCHC 34.5 33.6 - 35.0 g/dL    RDW 40.3 35.9 - 50.0 fL    Platelet Count 229 164 - 446 K/uL    MPV 10.0 9.0 - 12.9 fL    Neutrophils-Polys 74.40 (H) 44.00 - 72.00 %    Lymphocytes 17.30 (L) 22.00 - 41.00 %    Monocytes 6.50 0.00 - 13.40 %    Eosinophils 0.60 0.00 - 6.90 %    Basophils 0.60 0.00 - 1.80 %    Immature Granulocytes 0.60 0.00 - 0.90 %    Nucleated RBC 0.00 /100 WBC    Neutrophils (Absolute) 7.37 (H) 2.00 - 7.15 K/uL    Lymphs (Absolute) 1.71  1.00 - 4.80 K/uL    Monos (Absolute) 0.64 0.00 - 0.85 K/uL    Eos (Absolute) 0.06 0.00 - 0.51 K/uL    Baso (Absolute) 0.06 0.00 - 0.12 K/uL    Immature Granulocytes (abs) 0.06 0.00 - 0.11 K/uL    NRBC (Absolute) 0.00 K/uL    Micro Urine Req Microscopic     Rubella IgG Antibody 132.50 IU/mL    Syphilis, Treponemal Qual Non Reactive Non Reactive    Hepatitis B Surface Antigen Negative Negative    Color Light-Orange     Character Cloudy (A)     Specific Gravity 1.029 <1.035    Ph 6.0 5.0-8.0    Glucose Negative Negative mg/dL    Ketones Negative Negative mg/dL    Protein 50 (A) Negative mg/dL    Bilirubin Negative Negative    Nitrite Negative Negative    Leukocyte Esterase Negative Negative    Occult Blood Negative Negative    Culture Indicated No UA Culture   HIV ANTIBODIES    Collection Time: 01/03/17 10:45 AM   Result Value Ref Range    HIV Ag/Ab Combo Assay Non Reactive Non Reactive   OP PRENATAL PANEL-BLOOD BANK    Collection Time: 01/03/17 10:45 AM   Result Value Ref Range    ABO Grouping Only O     Rh Grouping Only POS     Antibody Screen Scrn NEG    URINE MICROSCOPIC (W/UA)    Collection Time: 01/03/17 10:45 AM   Result Value Ref Range    Epithelial Cells Few /hpf    Mucous Threads Few /hpf    Amorphous Crystal Present /hpf   AFP TETRA    Collection Time: 01/23/17  2:23 PM   Result Value Ref Range    AFP Value -Eia 26 ng/mL    AFP MOM Value 0.57     Hcg Value 63441 IU/L    Hcg Mom 0.79     Ue3 Value 1.71 ng/mL    Ue3 Mom 0.90     Interpretation Normal     Maternal Age at TEJA 25.5 yr    Gestational Age Based On LNMP     Gestational Age 19.71 weeks    Insulin Dependent Diabetes No     Race      Multiple Pregnancy One     Annetta Value -Eia 121 pg/mL    Annetta Mom Value 0.78     Maternal Weight 191 lbs    Err Maternal Scrn AFP See Note    GLUCOSE 1HR GESTATIONAL    Collection Time: 03/16/17  4:20 PM   Result Value Ref Range    Glucose, Post Dose 112 70 - 139 mg/dL   HCT    Collection Time: 03/16/17  4:20 PM    Result Value Ref Range    Hematocrit 40.1 37.0 - 47.0 %   HGB    Collection Time: 03/16/17  4:20 PM   Result Value Ref Range    Hemoglobin 13.3 12.0 - 16.0 g/dL   T.PALLIDUM AB EIA    Collection Time: 03/16/17  4:20 PM   Result Value Ref Range    Syphilis, Treponemal Qual Non Reactive Non Reactive   POC UA    Collection Time: 04/29/17  4:02 PM   Result Value Ref Range    POC Color Yellow     POC Appearance Clear     POC Glucose Negative Negative mg/dL    POC Ketones Negative Negative mg/dL    POC Specific Gravity 1.020 1.005-1.030    POC Blood Negative Negative    POC Urine PH 5.5 5.0-8.0    POC Protein Negative Negative mg/dL    POC Nitrites Negative Negative    POC Leukocyte Esterase Negative Negative   GRP B STREP, BY PCR (GRAY BROTH)    Collection Time: 05/12/17 11:51 AM   Result Value Ref Range    Strep Gp B DNA PCR POSITIVE (A) Negative   AMNISURE ROM ASSAY    Collection Time: 05/27/17  5:15 AM   Result Value Ref Range    AmniSure ROM Positive (AA) Negative       Reviewed by Corina Theodore M.D. on 1/26/2017  3:13 PM         Last Resulted Time     Thu Jan 26, 2017  3:04 PM       Associated Diagnoses      Prenatal care, subsequent pregnancy, second trimester           Imaging Result Status      Status           Final result (1/26/2017  3:02 PM)         Imaging Previous Results      Open Hard Copy Result Report (Order #[id number] - US-OB 2ND 3RD TRI COMPLETE)       Narrative        1/26/2017 1:37 PM    HISTORY/REASON FOR EXAM:  Evaluate fetal anatomy    TECHNIQUE/EXAM DESCRIPTION: OB complete ultrasound.    COMPARISON:  Pelvic ultrasound 10/29/2016    FINDINGS:  Fetal Lie:  Vertex  LMP:  Unknown  Clinical TEJA by LMP:  Not applicable    Placenta (Location):  Anterior  Placenta Previa: No  Placental Grade: I    Amniotic Fluid Volume:  TARIQ = 18.02 cm    Fetal Heart Rate:  149 bpm    Cervical Length:  3.77 cm transabdominal    No maternal adnexal mass is identified.    Umbilical Artery S/D Ratio(s):  Not  applicable    Fetal Anatomy  (Seen or Not Seen)  Lateral Ventricles     Seen  Cisterna Magna        Seen  Cerebellum              Seen  CSP             Seen  Orbits             Seen  Face/Lips                Seen  Cord Insertion         Seen  Placental CI         Seen  4 Chamber Heart     Seen  LVOT               Seen  RVOT              Seen  Stomach       Seen  Kidneys                   Seen  Urinary Bladder      Seen  Spine                       Seen  3 Vessel Cord          Seen  Both Upper Extremities    Seen  Both Lower Extremities    Seen  Diaphragm             Seen  Movement       Seen  Gender:  Likely male    Fetal Biometry  BPD    5.14 cm, 21 weeks, 4 days  HC    18.51 cm, 20 weeks, 6 days  AC    15.15 cm, 20 weeks, 3 days  Femur Length    3.35 cm, 20 weeks, 3 days  Humerus Length    3.19 cm, 20 weeks, 5 days  Cerebellum Diameter   2.25 cm    EGA by this US:  20 weeks, 6 days  TEJA by this US: 6/9/2017  TEJA by 1st US:  6/17/2017 by MD    Estimated Fetal Weight:  359 grams    Comments:         Impression        Single intrauterine pregnancy of an estimated gestational age of 20 weeks, 6 days with an estimated date of delivery of 6/9/2017.    Fetal survey within normal limits.          Assessment:   Laura Parks at 37w3d  Labor status: SROM and Active phase labor.  Obstetrical history significant for   Patient Active Problem List    Diagnosis Date Noted   • GBS (group B Streptococcus carrier), +RV culture, currently pregnant 05/15/2017   • Supervision of normal first pregnancy in first trimester 12/08/2016   .      Plan:     Admit to L&D  GBS positive  Category one strip

## 2017-05-27 NOTE — CARE PLAN
Problem: Infection  Goal: Will remain free from infection  Outcome: PROGRESSING AS EXPECTED  Pt remains free from signs and symptoms of infection            Intervention: Implement standard precautions and perform hand washing before and after patient contact  Hand hygiene performed before and after pt contact

## 2017-05-27 NOTE — IP AVS SNAPSHOT
Home Care Instructions                                                                                                                Laura Parks   MRN: [id number]    Department:  POST PARTUM 31   2017           Your appointments     2017  1:15 PM   Post Partum  Check with LETI BENJAMIN   [facility] ([facility])    [address] [place] [address] [address]  [place] [address]   [phone]              Follow-up Information     1. [facility], [facility] Go in 1 week.    Specialty:  OB/Gyn    Why:   incision check     Contact information    [address] [facility] ([place], [place])  [address]  [facility] [address]  [phone]         I assume responsibility for securing a follow-up [place] Screening blood test on my baby within the specified date range.    -                  Discharge Instructions       POSTPARTUM DISCHARGE INSTRUCTIONS FOR MOM    YOB: 1990   Age: 26 y.o.               Admit Date: 2017     Discharge Date: 2017  Attending Doctor:  Feli Hough*                  Allergies:  Review of patient's allergies indicates no known allergies.    Discharged to home by car. Discharged via wheelchair, hospital escort: Yes.  Special equipment needed: Not Applicable  Belongings with: Personal  Be sure to schedule a follow-up appointment with your primary care doctor or any specialists as instructed.     Discharge Plan:   Diet Plan: Discussed  Activity Level: Discussed  Confirmed Follow up Appointment: Appointment Scheduled  Confirmed Symptoms Management: Discussed  Medication Reconciliation Updated: No (Comments)  Influenza Vaccine Indication: Not indicated: Previously immunized this influenza season and > 8 years of age  Influenza Vaccine Given - only chart on this line when given: Influenza Vaccine Given (See MAR)    REASONS TO CALL YOUR OBSTETRICIAN:  1.   Persistent fever or shaking chills (Temperature higher than 100.4)  2.   Heavy bleeding (soaking more than 1 pad per  "hour); Passing clots  3.   Foul odor from vagina  4.   Mastitis (Breast infection; breast pain, chills, fever, redness)  5.   Urinary pain, burning or frequency  6.   Episiotomy infection  7.   Abdominal incision infection  8.   Severe depression longer than 24 hours    HAND WASHING  · Prior to handling the baby.  · Before breastfeeding or bottle feeding baby.  · After using the bathroom or changing the baby's diaper.    WOUND CARE  Ask your physician for additional care instructions.  In general:    ·  Incision:      · Keep clean and dry.    · Do NOT lift anything heavier than your baby for up to 6 weeks.    · There should not be any opening or pus.      VAGINAL CARE  · Nothing inside vagina for 6 weeks: no sexual intercourse, tampons or douching.  · Bleeding may continue for 2-4 weeks.  Amount may vary.    · Call your physician for heavy bleeding which means soaking more than 1 pad per hour    BIRTH CONTROL  · It is possible to become pregnant at any time after delivery and while breastfeeding.  · Plan to discuss a method of birth control with your physician at your follow up visit. visit.    DIET AND ELIMINATION  · Eating more fiber (bran cereal, fruits, and vegetables) and drinking plenty of fluids will help to avoid constipation.  · Urinary frequency after childbirth is normal.    POSTPARTUM BLUES  During the first few days after birth, you may experience a sense of the \"blues\" which may include impatience, irritability or even crying.  These feeling come and go quickly.  However, as many as 1 in 10 women experience emotional symptoms known as postpartum depression.    Postpartum depression:  May start as early as the second or third day after delivery or take several weeks or months to develop.  Symptoms of \"blues\" are present, but are more intense:  Crying spells; loss of appetite; feelings of hopelessness or loss of control; fear of touching the baby; over concern or no concern at all about the " "baby; little or no concern about your own appearance/caring for yourself; and/or inability to sleep or excessive sleeping.  Contact your physician if you are experiencing any of these symptoms.    Crisis Hotline:  · [place] Crisis Hotline:  9-983-LITIIVQ  Or 1-796.842.7268  · [place] Crisis Hotline:  1-608.120.6727  Or 452-737-2708    PREVENTING SHAKEN BABY:  If you are angry or stressed, PUT THE BABY IN THE CRIB, step away, take some deep breaths, and wait until you are calm to care for the baby.  DO NOT SHAKE THE BABY.  You are not alone, call a supporter for help.    · Crisis Call Center 24/7 crisis line 723-047-4430 or 1-873.108.8822  · You can also text them, text \"ANSWER\" to 089348    QUIT SMOKING/TOBACCO USE:  I understand the use of any tobacco products increases my chance of suffering from future heart disease and could cause other illnesses which may shorten my life. Quitting the use of tobacco products is the single most important thing I can do to improve my health. For further information on smoking / tobacco cessation call a Toll Free Quit Line at 1-261.558.8794 (*[employer]) or 1-886.243.9669 (American Lung Association) or you can access the web based program at www.lungusa.org.    · [place] Tobacco Users Help Line:  (789) 734-6378       Toll Free: 1-912.145.6679  · Quit Tobacco Program [facility] (940)406-2312    DEPRESSION / SUICIDE RISK:  As you are discharged from this [facility], it is important to learn how to keep safe from harming yourself.    Recognize the warning signs:  · Abrupt changes in personality, positive or negative- including increase in energy   · Giving away possessions  · Change in eating patterns- significant weight changes-  positive or negative  · Change in sleeping patterns- unable to sleep or sleeping all the time   · Unwillingness or inability to communicate  · Depression  · Unusual sadness, discouragement and " loneliness  · Talk of wanting to die  · Neglect of personal appearance   · Rebelliousness- reckless behavior  · Withdrawal from people/activities they love  · Confusion- inability to concentrate     If you or a loved one observes any of these behaviors or has concerns about self-harm, here's what you can do:  · Talk about it- your feelings and reasons for harming yourself  · Remove any means that you might use to hurt yourself (examples: pills, rope, extension cords, firearm)  · Get professional help from the community (Mental Health, Substance Abuse, psychological counseling)  · Do not be alone:Call your Safe Contact- someone whom you trust who will be there for you.  · Call your local CRISIS HOTLINE 109-9480 or 535-256-8935  · Call your local Children's Mobile Crisis Response Team [place] (812) 027-5862 or www.Cozy  · Call the toll free National Suicide Prevention Hotlines   · National Suicide Prevention Lifeline 423-301-VUZX (9923)  · National Hope Line Network 800-SUICIDE (687-9586)    DISCHARGE SURVEY:  Thank you for choosing [facility].  We hope we provided you with very good care.  You may be receiving a survey in the mail.  Please fill it out.  Your opinion is valuable to us.    ADDITIONAL EDUCATIONAL MATERIALS GIVEN TO PATIENT:        My signature on this form indicates that:  1.  I have reviewed and understand the above information  2.  My questions regarding this information have been answered to my satisfaction.  3.  I have formulated a plan with my discharge nurse to obtain my prescribed medication for home.         Discharge Medication Instructions:    Below are the medications your physician expects you to take upon discharge:    Review all your home medications and newly ordered medications with your doctor and/or pharmacist. Follow medication instructions as directed by your doctor and/or pharmacist.    Please keep your medication list with you and share with your  physician.               Medication List      START taking these medications        Instructions    Morning Afternoon Evening Bedtime     MG Caps        Take 100 mg by mouth 2 times a day as needed for Constipation.   Dose:  100 mg                        oxycodone-acetaminophen 5-325 MG Tabs   Last time this was given:  1 Tab on 5/31/2017  5:36 AM   Commonly known as:  PERCOCET        Take 1 Tab by mouth every four hours as needed (for Moderate Pain (Pain Scale 4-6) after delivery).   Dose:  1 Tab                          CONTINUE taking these medications        Instructions    Morning Afternoon Evening Bedtime    FOLIC ACID PO        Take  by mouth.                        PRENATAL 1 PO        Take  by mouth.                             Where to Get Your Medications      These medications were sent to [facility] - [place] - [address]  [address] [place] [address]     Phone:  [phone]    -  MG Caps      Information about where to get these medications is not yet available     ! Ask your nurse or doctor about these medications    - oxycodone-acetaminophen 5-325 MG Tabs            Crisis Hotline:     [place] Crisis Hotline:  5-154-JAWPLJW or 1-822.549.3264    [place] Crisis Hotline:    1-560.468.8097 or 953-299-3070        Disclaimer           _____________________________________                     __________       ________       Patient/Mother Signature or Legal                          Date                   Time

## 2017-05-27 NOTE — IP AVS SNAPSHOT
5/31/2017    Laura Parks  [address]  [facility] [address]    Dear Laura:    [facility] wants to ensure your discharge home is safe and you or your loved ones have had all of your questions answered regarding your care after you leave the hospital.    Below is a list of resources and contact information should you have any questions regarding your hospital stay, follow-up instructions, or active medical symptoms.    Questions or Concerns Regarding… Contact   Medical Questions Related to Your Discharge  (7 days a week, 8am-5pm) Contact a Nurse Care Coordinator   [phone]   Medical Questions Not Related to Your Discharge  (24 hours a day / 7 days a week)  Contact the Nurse Health Line   [phone]    Medications or Discharge Instructions Refer to your discharge packet   or contact your [facility] Primary Care Provider   [phone]   Follow-up Appointment(s) Schedule your appointment via [employer]   or contact Scheduling [phone]   Billing Review your statement via [employer]  or contact Billing [phone]   Medical Records Review your records via [employer]   or contact Medical Records [phone]     You may receive a telephone call within two days of discharge. This call is to make certain you understand your discharge instructions and have the opportunity to have any questions answered. You can also easily access your medical information, test results and upcoming appointments via the [employer] free online health management tool. You can learn more and sign up at [employer]. For assistance setting up your [employer] account, please call [phone].    Once again, we want to ensure your discharge home is safe and that you have a clear understanding of any next steps in your care. If you have any questions or concerns, please do not hesitate to contact us, we are here for you. Thank you for choosing [facility] for your healthcare needs.    Sincerely,    Your [facility] Healthcare Team

## 2017-05-27 NOTE — PROGRESS NOTES
0700: Assumed care of pt. AAO, pt using B/R techniques through . Pt educated on options for pain management. Pt wishes to have an un medicated birth. POC discussed, pt and RICHARD Baldwin verbalized understanding.

## 2017-05-27 NOTE — IP AVS SNAPSHOT
[facility] Access Code: Activation code not generated  Current [facility] Status: Active    [employer]  A secure, online tool to manage your health information     "[employer]’s [facility]® is a secure, online tool that connects you to your personalized health information from the privacy of your home -- day or night - making it very easy for you to manage your healthcare. Once the activation process is completed, you can even access your medical information using the [facility] maik, which is available for free in the [employer] Maik store or Google Play store.     [facility] provides the following levels of access (as shown below):   My Chart Features   [facility] Primary Care Doctor [facility]  Specialists [facility]  Urgent  Care Non-[facility]  Primary Care  Doctor   Email your healthcare team securely and privately 24/7 X X X X   Manage appointments: schedule your next appointment; view details of past/upcoming appointments X      Request prescription refills. X      View recent personal medical records, including lab and immunizations X X X X   View health record, including health history, allergies, medications X X X X   Read reports about your outpatient visits, procedures, consult and ER notes X X X X   See your discharge summary, which is a recap of your hospital and/or ER visit that includes your diagnosis, lab results, and care plan. X X       How to register for [facility]:  1. Go to  [email or web address].  2. Click on the Sign Up Now box, which takes you to the New Member Sign Up page. You will need to provide the following information:  a. Enter your [facility] Access Code exactly as it appears at the top of this page. (You will not need to use this code after you’ve completed the sign-up process. If you do not sign up before the expiration date, you must request a new code.)   b. Enter your date of birth.   c. Enter your home email address.   d. Click Submit, and follow the next screen’s instructions.  3. Create a [facility] ID. This will  be your [employer] login ID and cannot be changed, so think of one that is secure and easy to remember.  4. Create a [employer] password. You can change your password at any time.  5. Enter your Password Reset Question and Answer. This can be used at a later time if you forget your password.   6. Enter your e-mail address. This allows you to receive e-mail notifications when new information is available in [employer].  7. Click Sign Up. You can now view your health information.    For assistance activating your [employer] account, call [phone]

## 2017-05-28 LAB
AMPHET UR QL SCN: POSITIVE
BARBITURATES UR QL SCN: NEGATIVE
BENZODIAZ UR QL SCN: NEGATIVE
BZE UR QL SCN: NEGATIVE
CANNABINOIDS UR QL SCN: NEGATIVE
MDMA UR QL SCN: NEGATIVE
METHADONE UR QL SCN: NEGATIVE
OPIATES UR QL SCN: NEGATIVE
OXYCODONE UR QL SCN: NEGATIVE
PCP UR QL SCN: NEGATIVE
PROPOXYPH UR QL SCN: NEGATIVE

## 2017-05-28 PROCEDURE — 700102 HCHG RX REV CODE 250 W/ 637 OVERRIDE(OP): Performed by: NURSE PRACTITIONER

## 2017-05-28 PROCEDURE — A9270 NON-COVERED ITEM OR SERVICE: HCPCS

## 2017-05-28 PROCEDURE — 700102 HCHG RX REV CODE 250 W/ 637 OVERRIDE(OP): Performed by: ANESTHESIOLOGY

## 2017-05-28 PROCEDURE — 700111 HCHG RX REV CODE 636 W/ 250 OVERRIDE (IP)

## 2017-05-28 PROCEDURE — 305385 HCHG SURGICAL SERVICES 1/4 HOUR

## 2017-05-28 PROCEDURE — 304964 HCHG RECOVERY ROOM TIME 1HR

## 2017-05-28 PROCEDURE — A9270 NON-COVERED ITEM OR SERVICE: HCPCS | Performed by: NURSE PRACTITIONER

## 2017-05-28 PROCEDURE — 36415 COLL VENOUS BLD VENIPUNCTURE: CPT

## 2017-05-28 PROCEDURE — 3E033VJ INTRODUCTION OF OTHER HORMONE INTO PERIPHERAL VEIN, PERCUTANEOUS APPROACH: ICD-10-PCS | Performed by: OBSTETRICS & GYNECOLOGY

## 2017-05-28 PROCEDURE — 700111 HCHG RX REV CODE 636 W/ 250 OVERRIDE (IP): Performed by: NURSE PRACTITIONER

## 2017-05-28 PROCEDURE — A9270 NON-COVERED ITEM OR SERVICE: HCPCS | Performed by: ANESTHESIOLOGY

## 2017-05-28 PROCEDURE — 306828 HCHG ANES-TIME GENERAL: Performed by: OBSTETRICS & GYNECOLOGY

## 2017-05-28 PROCEDURE — 770002 HCHG ROOM/CARE - OB PRIVATE (112)

## 2017-05-28 PROCEDURE — 303615 HCHG EPIDURAL/SPINAL ANESTHESIA FOR LABOR

## 2017-05-28 PROCEDURE — 59514 CESAREAN DELIVERY ONLY: CPT

## 2017-05-28 PROCEDURE — 700101 HCHG RX REV CODE 250

## 2017-05-28 PROCEDURE — 700102 HCHG RX REV CODE 250 W/ 637 OVERRIDE(OP)

## 2017-05-28 PROCEDURE — 700111 HCHG RX REV CODE 636 W/ 250 OVERRIDE (IP): Performed by: ANESTHESIOLOGY

## 2017-05-28 PROCEDURE — 700105 HCHG RX REV CODE 258: Performed by: ANESTHESIOLOGY

## 2017-05-28 PROCEDURE — 10H07YZ INSERTION OF OTHER DEVICE INTO PRODUCTS OF CONCEPTION, VIA NATURAL OR ARTIFICIAL OPENING: ICD-10-PCS | Performed by: OBSTETRICS & GYNECOLOGY

## 2017-05-28 RX ORDER — OXYCODONE AND ACETAMINOPHEN 10; 325 MG/1; MG/1
1 TABLET ORAL EVERY 4 HOURS PRN
Status: DISCONTINUED | OUTPATIENT
Start: 2017-05-28 | End: 2017-05-31 | Stop reason: HOSPADM

## 2017-05-28 RX ORDER — ACETAMINOPHEN 325 MG/1
325 TABLET ORAL EVERY 4 HOURS PRN
Status: DISCONTINUED | OUTPATIENT
Start: 2017-05-28 | End: 2017-05-31 | Stop reason: HOSPADM

## 2017-05-28 RX ORDER — DOCUSATE SODIUM 100 MG/1
100 CAPSULE, LIQUID FILLED ORAL 2 TIMES DAILY PRN
Status: DISCONTINUED | OUTPATIENT
Start: 2017-05-28 | End: 2017-05-31 | Stop reason: HOSPADM

## 2017-05-28 RX ORDER — SIMETHICONE 80 MG
80 TABLET,CHEWABLE ORAL 4 TIMES DAILY PRN
Status: DISCONTINUED | OUTPATIENT
Start: 2017-05-28 | End: 2017-05-31 | Stop reason: HOSPADM

## 2017-05-28 RX ORDER — ONDANSETRON 4 MG/1
4 TABLET, ORALLY DISINTEGRATING ORAL EVERY 6 HOURS PRN
Status: DISCONTINUED | OUTPATIENT
Start: 2017-05-28 | End: 2017-05-31 | Stop reason: HOSPADM

## 2017-05-28 RX ORDER — ONDANSETRON 2 MG/ML
4 INJECTION INTRAMUSCULAR; INTRAVENOUS EVERY 6 HOURS PRN
Status: DISCONTINUED | OUTPATIENT
Start: 2017-05-28 | End: 2017-05-31 | Stop reason: HOSPADM

## 2017-05-28 RX ORDER — OXYCODONE HYDROCHLORIDE AND ACETAMINOPHEN 5; 325 MG/1; MG/1
1 TABLET ORAL EVERY 4 HOURS PRN
Status: DISCONTINUED | OUTPATIENT
Start: 2017-05-28 | End: 2017-05-31 | Stop reason: HOSPADM

## 2017-05-28 RX ORDER — IBUPROFEN 800 MG/1
800 TABLET ORAL EVERY 8 HOURS PRN
Status: DISCONTINUED | OUTPATIENT
Start: 2017-05-28 | End: 2017-05-31 | Stop reason: HOSPADM

## 2017-05-28 RX ORDER — MISOPROSTOL 200 UG/1
800 TABLET ORAL
Status: DISCONTINUED | OUTPATIENT
Start: 2017-05-28 | End: 2017-05-31 | Stop reason: HOSPADM

## 2017-05-28 RX ORDER — VITAMIN A ACETATE, BETA CAROTENE, ASCORBIC ACID, CHOLECALCIFEROL, .ALPHA.-TOCOPHEROL ACETATE, DL-, THIAMINE MONONITRATE, RIBOFLAVIN, NIACINAMIDE, PYRIDOXINE HYDROCHLORIDE, FOLIC ACID, CYANOCOBALAMIN, CALCIUM CARBONATE, FERROUS FUMARATE, ZINC OXIDE, CUPRIC OXIDE 3080; 12; 120; 400; 1; 1.84; 3; 20; 22; 920; 25; 200; 27; 10; 2 [IU]/1; UG/1; MG/1; [IU]/1; MG/1; MG/1; MG/1; MG/1; MG/1; [IU]/1; MG/1; MG/1; MG/1; MG/1; MG/1
1 TABLET, FILM COATED ORAL EVERY MORNING
Status: DISCONTINUED | OUTPATIENT
Start: 2017-05-28 | End: 2017-05-31 | Stop reason: HOSPADM

## 2017-05-28 RX ORDER — METHYLERGONOVINE MALEATE 0.2 MG/ML
0.2 INJECTION INTRAVENOUS
Status: DISCONTINUED | OUTPATIENT
Start: 2017-05-28 | End: 2017-05-31 | Stop reason: HOSPADM

## 2017-05-28 RX ORDER — SODIUM CHLORIDE, SODIUM LACTATE, POTASSIUM CHLORIDE, CALCIUM CHLORIDE 600; 310; 30; 20 MG/100ML; MG/100ML; MG/100ML; MG/100ML
INJECTION, SOLUTION INTRAVENOUS PRN
Status: DISCONTINUED | OUTPATIENT
Start: 2017-05-28 | End: 2017-05-31 | Stop reason: HOSPADM

## 2017-05-28 RX ADMIN — OXYCODONE HYDROCHLORIDE AND ACETAMINOPHEN 1 TABLET: 5; 325 TABLET ORAL at 11:13

## 2017-05-28 RX ADMIN — METOCLOPRAMIDE 10 MG: 5 INJECTION, SOLUTION INTRAMUSCULAR; INTRAVENOUS at 00:17

## 2017-05-28 RX ADMIN — IBUPROFEN 800 MG: 800 TABLET, FILM COATED ORAL at 05:43

## 2017-05-28 RX ADMIN — OXYCODONE HYDROCHLORIDE AND ACETAMINOPHEN 1 TABLET: 5; 325 TABLET ORAL at 16:00

## 2017-05-28 RX ADMIN — Medication 1 TABLET: at 08:27

## 2017-05-28 RX ADMIN — SODIUM CHLORIDE, POTASSIUM CHLORIDE, SODIUM LACTATE AND CALCIUM CHLORIDE 1500 ML: 600; 310; 30; 20 INJECTION, SOLUTION INTRAVENOUS at 00:00

## 2017-05-28 RX ADMIN — FAMOTIDINE 20 MG: 10 INJECTION, SOLUTION INTRAVENOUS at 00:16

## 2017-05-28 RX ADMIN — OXYCODONE HYDROCHLORIDE AND ACETAMINOPHEN 1 TABLET: 5; 325 TABLET ORAL at 20:12

## 2017-05-28 RX ADMIN — IBUPROFEN 800 MG: 800 TABLET, FILM COATED ORAL at 16:00

## 2017-05-28 RX ADMIN — SODIUM CITRATE AND CITRIC ACID MONOHYDRATE 30 ML: 500; 334 SOLUTION ORAL at 00:17

## 2017-05-28 RX ADMIN — Medication 125 ML/HR: at 01:35

## 2017-05-28 RX ADMIN — OXYCODONE HYDROCHLORIDE AND ACETAMINOPHEN 1 TABLET: 5; 325 TABLET ORAL at 04:21

## 2017-05-28 ASSESSMENT — PAIN SCALES - GENERAL
PAINLEVEL_OUTOF10: 0
PAINLEVEL_OUTOF10: 7
PAINLEVEL_OUTOF10: 5
PAINLEVEL_OUTOF10: 0
PAINLEVEL_OUTOF10: 3
PAINLEVEL_OUTOF10: 0
PAINLEVEL_OUTOF10: 6
PAINLEVEL_OUTOF10: 0
PAINLEVEL_OUTOF10: 7
PAINLEVEL_OUTOF10: 0
PAINLEVEL_OUTOF10: 5

## 2017-05-28 ASSESSMENT — COPD QUESTIONNAIRES
HAVE YOU SMOKED AT LEAST 100 CIGARETTES IN YOUR ENTIRE LIFE: NO/DON'T KNOW
DURING THE PAST 4 WEEKS HOW MUCH DID YOU FEEL SHORT OF BREATH: NONE/LITTLE OF THE TIME
DO YOU EVER COUGH UP ANY MUCUS OR PHLEGM?: NO/ONLY WITH OCCASIONAL COLDS OR INFECTIONS

## 2017-05-28 ASSESSMENT — LIFESTYLE VARIABLES: DO YOU DRINK ALCOHOL: NO

## 2017-05-28 NOTE — PROGRESS NOTES
"- Called to bedside by FOB, pt appeared to \"pass out\", VSS, pt awake and in a lot of pain, MD at bedside, assessment complete, [id number], pt would like epidural, fluid bolus started    - Dr. Bravo at bedside for placement of epidural    12- C/s called by Dr. Linder for fetal intolerance and arrest of labor, see flowsheets for previous interventions. Urine screen + for amphetamines, transition RN notified, SW consult placed for PPU    49-  delivery of viable male, apgars 3/8, cord gases drawn     315- Pt transferred to PPU, bedside report to Jose AUGUSTIN  "

## 2017-05-28 NOTE — CARE PLAN
Problem: Safety  Goal: Will remain free from injury  Pt instructed to use the call light when needing assistance. Pt confirmed understanding.     Problem: Urinary Elimination:  Goal: Ability to reestablish a normal urinary elimination pattern will improve  Montes De Oca in place till pt comes off bed rest.

## 2017-05-28 NOTE — PROGRESS NOTES
Reassessed SVE as previously discussed.  Minimal change, now 6/100/-1.  FHT's- Cat 2 with mod variability, + accels, and + variable/late decels.  Updated MD, will come review tracing and discuss POC with patient.    Lissette Rashid CNM

## 2017-05-28 NOTE — CARE PLAN
Problem: Communication  Goal: The ability to communicate needs accurately and effectively will improve  Outcome: PROGRESSING AS EXPECTED  Patient oriented to unit and to room. Whiteboards and POC discussed. Patient encouraged to call with any needs and or concerns.     Problem: Altered physiologic condition related to postoperative  delivery  Goal: Patient physiologically stable as evidenced by normal lochia, palpable uterine involution and vital signs within normal limits  Outcome: PROGRESSING AS EXPECTED  VSS. Fundus firm with light lochia. Patient educated on when to pull emergency call light.

## 2017-05-28 NOTE — PROGRESS NOTES
In room to assess deceleration.  Prolonged deceleration noted with abelino to 75 bpm. Lasted total of 5 minutes.  Pitocin was stopped, position changed. Terb given   Will reassess SVE at midnight. Pitocin will be restarted at 2300 if FHT's are reactive and appropriate.  POC discussed with patient. Dr Linder, who is the attending, has been updated on status.    Lissette SEWELL

## 2017-05-28 NOTE — PROGRESS NOTES
RN unable to trace FHT's with patient position, and there was some concern over decelerations.  SVE done, no change. FSE placed without incident. Will give an amnioinfusion of 300 ml bolus. If tracing is adequate and reactive, will restart pitocin.    Lissette Rashid CNM

## 2017-05-28 NOTE — PROGRESS NOTES
L&D Progress Note    Name:   Laura Parks   Date/Time:  5/27/2017 8:53 PM  Gestational Age:  37w3d  Admit Date:   5/27/2017  Admitting Dx:   Pregnancy  Indication for care in labor and delivery, antepartum    Subjective:  Uterine contractions: yes  Pain:    no  Complaints:   no   Headache: .  no  Blurred vision:  no   SOB:    no   Nausea/vomiting:  no  Epigastric pain:  no  Fetal movement:  normal  Vaginal bleeding: . no    Objective:   Filed Vitals:    05/27/17 1709 05/27/17 1731 05/27/17 1749 05/27/17 1810   BP: 109/67 106/56 139/81 133/76   Pulse: 64 60 68 60   Temp:       TempSrc:       Height:       Weight:         Fetal heart variability: moderate  Fetal Heart Rate decelerations: none  Fetal Heart Rate accelerations: yes  Baseline FHR: 140 per minute  Uterine contractions: none  Cervical: 100% ;  Dilatation .6 ; Station negative1    Fetal position:   Cephalic  Membranes ruptured: yes  AROM:  no  Meconium: .  no    IUPC: .   yes  FSE .   no    Meds:   Epidural : .  yes  Magnesium sulfate: . no  Pitocin: .  yes    Labs:  Recent Results (from the past 72 hour(s))   AMNISURE ROM ASSAY    Collection Time: 05/27/17  5:15 AM   Result Value Ref Range    AmniSure ROM Positive (AA) Negative   Hold Blood Bank Specimen (Not Tested)    Collection Time: 05/27/17  6:10 AM   Result Value Ref Range    Holding Tube - Bb DONE    CBC WITH DIFFERENTIAL    Collection Time: 05/27/17  6:10 AM   Result Value Ref Range    WBC 12.8 (H) 4.8 - 10.8 K/uL    RBC 4.60 4.20 - 5.40 M/uL    Hemoglobin 13.5 12.0 - 16.0 g/dL    Hematocrit 40.5 37.0 - 47.0 %    MCV 88.0 81.4 - 97.8 fL    MCH 29.3 27.0 - 33.0 pg    MCHC 33.3 (L) 33.6 - 35.0 g/dL    RDW 44.0 35.9 - 50.0 fL    Platelet Count 207 164 - 446 K/uL    MPV 10.8 9.0 - 12.9 fL    Neutrophils-Polys 73.00 (H) 44.00 - 72.00 %    Lymphocytes 16.20 (L) 22.00 - 41.00 %    Monocytes 8.90 0.00 - 13.40 %    Eosinophils 0.70 0.00 - 6.90 %    Basophils 0.30 0.00 - 1.80 %    Immature  Granulocytes 0.90 0.00 - 0.90 %    Nucleated RBC 0.00 /100 WBC    Neutrophils (Absolute) 9.31 (H) 2.00 - 7.15 K/uL    Lymphs (Absolute) 2.07 1.00 - 4.80 K/uL    Monos (Absolute) 1.14 (H) 0.00 - 0.85 K/uL    Eos (Absolute) 0.09 0.00 - 0.51 K/uL    Baso (Absolute) 0.04 0.00 - 0.12 K/uL    Immature Granulocytes (abs) 0.11 0.00 - 0.11 K/uL    NRBC (Absolute) 0.00 K/uL         Assessment:   Gestational Age:   37w3d  Risk Factors:   group B strep colonizer  Labor State:    Active phase labor.  Pregnancy Complications: GBS positive  Plan:    Reassess after 2 hours    Patient Active Problem List    Diagnosis Date Noted   • Indication for care in labor and delivery, antepartum 05/27/2017   • GBS (group B Streptococcus carrier), +RV culture, currently pregnant 05/15/2017   • Supervision of normal first pregnancy in first trimester 12/08/2016     IUPC placed without difficulty.  Pitocin restarted  Patient had an episode before her epidural where her family felt like she passed out. FHT's still reactive during this episode, SVE, no change. VS stable including SpO2 of 100% on room air. Patient stated she was having a hard time coping with contractions and breathing at the same time. She received a dose of Fentanyl after thorough assessment done, and was prepped for an epidural.  She is now comfortable with her epidural and able to relax.    Will reassess in 2 hours for cervical change.    Lissette Rashid C.N.M.
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
## 2017-05-28 NOTE — CONSULTS
Spoke with parents regarding mom's positive result for Amphetamine.  We discussed pumping for now and feeding baby formula until further recommendation from baby's MD.  Parents state they want to do what is best for baby and will not breastfeed until they are told it is safe for their baby.

## 2017-05-28 NOTE — OP REPORT
DATE OF SERVICE:  2017    PREOPERATIVE DIAGNOSES:  Arrest of dilatation and nonreassuring fetal heart   rate tracing at 37+ weeks.    POSTOPERATIVE DIAGNOSES:  Arrest of dilatation and nonreassuring fetal heart   rate tracing at 37+ weeks.    SURGEON:  Luther Linder MD    ASSISTANT:  Amanda Rashid CNM    PROCEDURE:  Primary low transverse uterine  section.    COMPLICATIONS:  None.    DRAINS:  Montes De Oca catheter.    SPECIMENS:  Cord gases sent to pathology department.    ESTIMATED BLOOD LOSS:  600 mL.    ANESTHESIA:  Epidural.    ANESTHESIOLOGIST:  Albert Bravo DO    INDICATIONS:  This patient is a 26-year-old  female,  1, para 0   at 37 and 4/7th weeks, admitted with spontaneous rupture of the membranes.    The patient was on Pitocin augmentation of labor, dilated to 9 cm with very   significant caput and swelling in the fetal scalp and having intermittent late   decelerations, but maintaining good [id number] variability.    FINDINGS:  Apgar scores 3 and 8.  Cord gases, arterial pH 7.14 with a base   excess of -6, venous pH 7.19 with a base excess of -8.  Clear amniotic fluid,   cephalic presentation, significant molding of the fetal caput.    OPERATION:  After adequately being counseled, the patient was taken to the   operating room and placed in the supine position.  Epidural anesthesia was   dosed.  Patient was prepped and draped in the usual sterile fashion.  A   Pfannenstiel skin incision made 2 cm above the pubic bone with a scalpel and   taken down to the fascia.  The fascia was incised with scalpel and the fascial   incision taken laterally on both sides with Guerin scissors.  Rectus fascia   dissected off the underlying rectus muscles both superiorly and inferiorly and   rectus muscles were split in the midline.  Peritoneal cavity was entered   bluntly with digits and the peritoneal incision taken superiorly and   inferiorly with Metzenbaum scissors.  A bladder flap was then  developed both   sharply and bluntly and a bladder blade placed over the bladder.  Next, a low   transverse uterine incision was made with a scalpel.  Infant was delivered,   bulb suctioned, umbilical cord clamped and cut, and the infant handed off to   pediatrics.  Placenta was removed from the uterus.  Uterine cavity was   cleansed with a moist laparotomy sponge.  Segment of umbilical cord was given   to nurses to draw cord gases.  Next, the uterus was exteriorized and uterine   incision was closed in 2 layers, first a running locking stitch of 0 Vicryl   and then a running lock stitch of 0 Vicryl to perform an imbricating layer.    Next, good hemostasis noted.  Uterus returned to abdominal pelvic cavity.  The   pelvis was irrigated and suctioned and hemostasis confirmed.  The peritoneal   lining was closed using running nonlocked stitch of 0 Vicryl.  Rectus muscles   were reapproximated using interrupted stitch of 0 chromic and the rectus   fascia closed using running nonlocked stitch of 0 Vicryl.  Subcutaneous   tissues were irrigated and suctioned and electrocautery used for hemostasis.    Several subcuticular stitches of 0 Vicryl used to reapproximate subcutaneous   tissues and the skin was closed using surgical staples.  Pressure dressing was   applied and the patient was taken to recovery room in good condition.  No   complications noted.       ____________________________________     MD RIK ALMAZAN / MONALISA    DD:  05/28/2017 01:38:29  DT:  05/28/2017 02:16:00    D#:  [id number]  Job#:  [id number]

## 2017-05-28 NOTE — PROGRESS NOTES
0300: Pt arrived to room 316 via [facility]. Report received from GAMAL Valadez. IV patent running second bag of pitocin at 125 cc/hr. Montes De Oca draining to gravity, SCD's on. Patient assessed fundus firm with light lochia. Patient educated on when to pull emergency call light. Bands and cuddles verified. Pt declines pain at this time and will call when needing pain medication. POC discussed. Pt oriented to room,skylight, whiteboards updated. Call light/ emergency call light use and pink badges discussed. Call light within reach.

## 2017-05-29 LAB
AMPHET UR QL SCN: NEGATIVE
BARBITURATES UR QL SCN: NEGATIVE
BENZODIAZ UR QL SCN: NEGATIVE
BZE UR QL SCN: NEGATIVE
CANNABINOIDS UR QL SCN: NEGATIVE
ERYTHROCYTE [DISTWIDTH] IN BLOOD BY AUTOMATED COUNT: 45.7 FL (ref 35.9–50)
HCT VFR BLD AUTO: 35.5 % (ref 37–47)
HGB BLD-MCNC: 11.5 G/DL (ref 12–16)
MCH RBC QN AUTO: 29.1 PG (ref 27–33)
MCHC RBC AUTO-ENTMCNC: 32.4 G/DL (ref 33.6–35)
MCV RBC AUTO: 89.9 FL (ref 81.4–97.8)
MDMA UR QL SCN: NEGATIVE
METHADONE UR QL SCN: NEGATIVE
OPIATES UR QL SCN: NEGATIVE
OXYCODONE UR QL SCN: POSITIVE
PCP UR QL SCN: NEGATIVE
PLATELET # BLD AUTO: 175 K/UL (ref 164–446)
PMV BLD AUTO: 10.6 FL (ref 9–12.9)
PROPOXYPH UR QL SCN: NEGATIVE
RBC # BLD AUTO: 3.95 M/UL (ref 4.2–5.4)
WBC # BLD AUTO: 12.6 K/UL (ref 4.8–10.8)

## 2017-05-29 PROCEDURE — 36415 COLL VENOUS BLD VENIPUNCTURE: CPT

## 2017-05-29 PROCEDURE — A9270 NON-COVERED ITEM OR SERVICE: HCPCS | Performed by: NURSE PRACTITIONER

## 2017-05-29 PROCEDURE — 80307 DRUG TEST PRSMV CHEM ANLYZR: CPT

## 2017-05-29 PROCEDURE — G0480 DRUG TEST DEF 1-7 CLASSES: HCPCS

## 2017-05-29 PROCEDURE — 700102 HCHG RX REV CODE 250 W/ 637 OVERRIDE(OP): Performed by: NURSE PRACTITIONER

## 2017-05-29 PROCEDURE — 85027 COMPLETE CBC AUTOMATED: CPT

## 2017-05-29 PROCEDURE — 770002 HCHG ROOM/CARE - OB PRIVATE (112)

## 2017-05-29 RX ADMIN — Medication 1 TABLET: at 07:44

## 2017-05-29 RX ADMIN — OXYCODONE HYDROCHLORIDE AND ACETAMINOPHEN 1 TABLET: 10; 325 TABLET ORAL at 13:47

## 2017-05-29 RX ADMIN — IBUPROFEN 800 MG: 800 TABLET, FILM COATED ORAL at 13:47

## 2017-05-29 RX ADMIN — IBUPROFEN 800 MG: 800 TABLET, FILM COATED ORAL at 04:46

## 2017-05-29 RX ADMIN — OXYCODONE HYDROCHLORIDE AND ACETAMINOPHEN 1 TABLET: 10; 325 TABLET ORAL at 04:46

## 2017-05-29 ASSESSMENT — PAIN SCALES - GENERAL
PAINLEVEL_OUTOF10: 6
PAINLEVEL_OUTOF10: 0

## 2017-05-29 NOTE — DISCHARGE PLANNING
ZULY spoke with CPS worker who advised she will come out to see pt this afternoon and requested a meconium test on baby if possible. RN notified by ZULY Coleman.

## 2017-05-29 NOTE — PROGRESS NOTES
Waiting to give 2nd urine screen.  Instructed on how to use abd binder.   called and will discuss need for evaluation.  Bottle feeding baby.  Parents are bonding. Voiding. Passing gas. Eating well. Firm at. Lochia light. Dressing intact and will remove during shower.

## 2017-05-29 NOTE — PROGRESS NOTES
Received report from Day Shift, RN. Patient in bed holding infant. No concerns at this time. Will continue to monitor.

## 2017-05-29 NOTE — PROGRESS NOTES
POSTPARTUM NOTE.    S:  Pt reports her pain is  well controlled on oral pain medicines.  She is  ambulating well.  She is  tolerating a regular diet.  She is not yet breastfeeding.  Lactation consult  Completed.  Holding off breastfeeding till amphetamine negative.  She is  urinating well.  She has  had a bowel movement or flatus since delivery.  Social concerns are  Present, centered around amphetamine positive.  Pt denies amphetamine use.        Current facility-administered medications:   •  LR infusion, , Intravenous, PRN, Lissette Rashid, C.N.M.  •  PRN oxytocin (PITOCIN) (20 Units/1000 mL) PRN for excessive uterine bleeding - See Admin Instr, 125-999 mL/hr, Intravenous, Once PRN, Lissette Rashid, C.N.M.  •  misoprostol (CYTOTEC) tablet 800 mcg, 800 mcg, Rectal, Once PRN, Lissette Rashid, C.N.M.  •  methylergonovine (METHERGINE) injection 0.2 mg, 0.2 mg, Intramuscular, Once PRN, Lissette Rashid, C.N.M.  •  docusate sodium (COLACE) capsule 100 mg, 100 mg, Oral, BID PRN, Lissette Rashid, C.N.M.  •  magnesium hydroxide (MILK OF MAGNESIA) suspension 30 mL, 30 mL, Oral, Q6HRS PRN, Lissette Rashid, C.N.M.  •  simethicone (MYLICON) chewable tab 80 mg, 80 mg, Oral, 4X/DAY PRN, Lissette Rashid, C.N.M.  •  prenatal plus vitamin (STUARTNATAL 1+1) 27-1 MG tablet 1 Tab, 1 Tab, Oral, QAM, Lissette Rashid, C.N.M., 1 Tab at 05/28/17 0827  •  ibuprofen (MOTRIN) tablet 800 mg, 800 mg, Oral, Q8HRS PRN, Lissette Rashid, C.N.M., 800 mg at 05/29/17 0446  •  acetaminophen (TYLENOL) tablet 325 mg, 325 mg, Oral, Q4HRS PRN, Lissette Rashid, C.N.M.  •  oxycodone-acetaminophen (PERCOCET) 5-325 MG per tablet 1 Tab, 1 Tab, Oral, Q4HRS PRN, JDU Damico.N.M., 1 Tab at 05/28/17 2012  •  oxycodone-acetaminophen (PERCOCET-10)  MG per tablet 1 Tab, 1 Tab, Oral, Q4HRS PRN, JUD Damico.N.M., 1 Tab at 05/29/17 0446  •  ondansetron (ZOFRAN) syringe/vial injection 4 mg, 4 mg, Intravenous, Q6HRS PRN **OR** ondansetron  "(ZOTALYA ODT) dispertab 4 mg, 4 mg, Oral, Q6HRS PRN, Lissette Rashid, C.N.M.  •  [] oxytocin (PITOCIN) infusion (for postpartum), 2,000 mL/hr, Intravenous, Once **FOLLOWED BY** oxytocin (PITOCIN) infusion (for postpartum),  mL/hr, Intravenous, Continuous, Ally Redd D.N.P., Last Rate: 125 mL/hr at 17 013, 125 mL/hr at 17 013  •  oxytocin (PITOCIN) 20 UNITS/1000ML LR (induction of labor), 0.5-20 jyoti-units/min, Intravenous, Continuous, Primitivo Torres M.D., Stopped at 17 2354      O  Blood pressure 124/64, pulse 75, temperature 36.7 °C (98 °F), temperature source Oral, resp. rate 22, height 1.727 m (5' 8\"), weight 96.616 kg (213 lb), SpO2 96 %.  Gen: Alert and Oriented.  No acute distress.    Heart: Regular in rate  Lungs: breathing unlabored  Abdomen:  Soft.  Appropriately tender.  Fundus firm at  umbilicus.  Ext: edema + 1, nontender, no signs of DVT      Lab Results   Component Value Date/Time    WBC 12.6* 2017 04:43 AM    RBC 3.95* 2017 04:43 AM    HEMOGLOBIN 11.5* 2017 04:43 AM    HEMATOCRIT 35.5* 2017 04:43 AM    MCV 89.9 2017 04:43 AM    MCH 29.1 2017 04:43 AM    MCHC 32.4* 2017 04:43 AM    MPV 10.6 2017 04:43 AM      No components found for: RUBELLA, BLOODTYPE, ANTIBODY    A/P  Postpartum day 2 of a cesarea delivery for fetal intolerance to labor  Doing well, meeting goals.  RH status positive.  Rhogam not indicated  Breastfeeding holding off for now but in contact with the lactation consultant.  DOA positive for amphetamine. Pt requests confirmatory test.  Will send out blood and urine samples today.    Continue current care.    Anticipate discharge on POD 3 or 4        "

## 2017-05-30 PROCEDURE — A9270 NON-COVERED ITEM OR SERVICE: HCPCS | Performed by: NURSE PRACTITIONER

## 2017-05-30 PROCEDURE — 700102 HCHG RX REV CODE 250 W/ 637 OVERRIDE(OP): Performed by: NURSE PRACTITIONER

## 2017-05-30 PROCEDURE — 770002 HCHG ROOM/CARE - OB PRIVATE (112)

## 2017-05-30 RX ORDER — PSEUDOEPHEDRINE HCL 30 MG
100 TABLET ORAL 2 TIMES DAILY PRN
Qty: 60 CAP | Refills: 1 | Status: SHIPPED | OUTPATIENT
Start: 2017-05-30 | End: 2023-10-14

## 2017-05-30 RX ORDER — OXYCODONE HYDROCHLORIDE AND ACETAMINOPHEN 5; 325 MG/1; MG/1
1 TABLET ORAL EVERY 4 HOURS PRN
Qty: 30 TAB | Refills: 0 | Status: SHIPPED | OUTPATIENT
Start: 2017-05-30 | End: 2023-10-14

## 2017-05-30 RX ADMIN — OXYCODONE HYDROCHLORIDE AND ACETAMINOPHEN 1 TABLET: 5; 325 TABLET ORAL at 01:57

## 2017-05-30 RX ADMIN — IBUPROFEN 800 MG: 800 TABLET, FILM COATED ORAL at 01:57

## 2017-05-30 RX ADMIN — IBUPROFEN 800 MG: 800 TABLET, FILM COATED ORAL at 13:00

## 2017-05-30 RX ADMIN — Medication 1 TABLET: at 09:07

## 2017-05-30 RX ADMIN — OXYCODONE HYDROCHLORIDE AND ACETAMINOPHEN 1 TABLET: 5; 325 TABLET ORAL at 13:00

## 2017-05-30 ASSESSMENT — PAIN SCALES - GENERAL
PAINLEVEL_OUTOF10: 2
PAINLEVEL_OUTOF10: 0
PAINLEVEL_OUTOF10: 6
PAINLEVEL_OUTOF10: 0
PAINLEVEL_OUTOF10: 2
PAINLEVEL_OUTOF10: 5
PAINLEVEL_OUTOF10: 2

## 2017-05-30 NOTE — DISCHARGE PLANNING
:    Ongoing:  Received a message from Mikaela Hernandez DSS who met with MOB and needs to do a home visit.  The visit cannot be scheduled until tomorrow at 8:00 am.  Discussed with RN and parents that we need to have the home visit done by CPS before they are discharged.    Plan:  [facility] will be doing the home visit tomorrow morning.  Mikaela Hernandez will contact hospital SW after home visit to discuss discharge plan.

## 2017-05-30 NOTE — DISCHARGE SUMMARY
[facility]  SECTION DISCHARGE SUMMARY    PATIENT ID:  NAME:  Laura Parks  MRN:               [id number]  YOB: 1990  DATE OF ADMISSION: 2017   DATE OF DISCHARGE:2017     DISCHARGE DIAGNOSES:  1. Intrauterine gestation at term.  2. Delivered viable male infant.    PROCEDURES PERFORMED:  1. priamry low transverse  section.      COMPLICATIONS: None.    HOSPITAL COURSE: This is a 26 y.o. year-old female was admitted as a  at 37w6d for primary  section. Pregnancy was complicated by GBS + Her  course was uncomplicated/significant for dysfunctional labor pattern and ultimately p c/s for fetal intolerance of labor.  Informed consent was obtained. Under spinal anesthesia an uncomplicated primary low transverse  section was performed. A viable male infant with Apgars of 3 and 8 was delivered. The patient's postoperative course was uneventful/was complicated by social work involvement due to amphetamine positivity in UDS intrapartum, otherwise uncomplicated. She remained afebrile with stable vital signs. Pt has been ambulating, tolerating a regular diet and has had return of normal GI function. Pain has been well controlled with motrin and percocet . The wound is healing nicely. Prior to discharge staples will be removed and steri strips placed    LABS:   Recent Labs      17   0443   WBC  12.6*   RBC  3.95*   HEMOGLOBIN  11.5*   HEMATOCRIT  35.5*   MCV  89.9   MCH  29.1   RDW  45.7   PLATELETCT  175   MPV  10.6        Laura Parks   Home Medication Instructions MARLENE:[id number]    Printed on:17 0641   Medication Information                      FOLIC ACID PO  Take  by mouth.             Prenatal MV-Min-Fe Fum-FA-DHA (PRENATAL 1 PO)  Take  by mouth.                 CONDITION: Stable.    DISPOSITION: Home.    ACTIVITY: Slow increase as tolerated. No lifting heavier than baby. Strict pelvic rest, no intercourse or any object  inserted into vagina x 5 weeks.    DIET:  Regular    EXAM:  General: Well appearing, NAD  Breast: No significant tenderness or erythem  CV: RRR, no m/r/g  Pulm: CTAB  Abdomen: Fundus firm, otherwise soft with + BS, incision well approximated with staples in place  Lochia: light  Ext: no significant edema, clubbing, or cyanosis      FOLLOW UP:   1) [facility] and also in 4-6 weeks.     Primitivo Torres M.D.

## 2017-05-30 NOTE — PROGRESS NOTES
Assumed care. Assessment completed, fundus firm, lochia light. ABD incision with staples intact, no signs of infection. POC discussed, verbalized understanding. Patient will call if pain meds needed.

## 2017-05-30 NOTE — DISCHARGE PLANNING
:    Ongoing:  [phone] the assigned worker.  Message left for Mikaela.    Plan:  Continue to follow.

## 2017-05-30 NOTE — CARE PLAN
Problem: Potential for postpartum infection related to surgical incision, compromised uterine condition, urinary tract or respiratory compromise  Goal: Patient will be afebrile and free from signs and symptoms of infection  Outcome: PROGRESSING AS EXPECTED  No signs or symptoms of infection noted or reported.     Problem: Alteration in comfort related to surgical incision and/or after birth pains  Goal: Patient is able to ambulate, care for self and infant with acceptable pain level  Outcome: PROGRESSING AS EXPECTED  Patient ambulating, caring for self and infant.

## 2017-05-31 VITALS
HEART RATE: 91 BPM | WEIGHT: 213 LBS | BODY MASS INDEX: 32.28 KG/M2 | TEMPERATURE: 97.6 F | RESPIRATION RATE: 20 BRPM | OXYGEN SATURATION: 98 % | DIASTOLIC BLOOD PRESSURE: 63 MMHG | SYSTOLIC BLOOD PRESSURE: 116 MMHG | HEIGHT: 68 IN

## 2017-05-31 LAB
AMPHET CTO UR CFM-MCNC: NEGATIVE NG/ML
BARBITURATES CTO UR CFM-MCNC: NEGATIVE NG/ML
BENZODIAZ CTO UR CFM-MCNC: NEGATIVE NG/ML
CANNABINOIDS CTO UR CFM-MCNC: NEGATIVE NG/ML
COCAINE CTO UR CFM-MCNC: NEGATIVE NG/ML
DRUG COMMENT 753798: NORMAL
METHADONE CTO UR CFM-MCNC: NEGATIVE NG/ML
OPIATES CTO UR CFM-MCNC: POSITIVE NG/ML
PCP CTO UR CFM-MCNC: NEGATIVE NG/ML
PROPOXYPH CTO UR CFM-MCNC: NEGATIVE NG/ML

## 2017-05-31 PROCEDURE — 700102 HCHG RX REV CODE 250 W/ 637 OVERRIDE(OP): Performed by: NURSE PRACTITIONER

## 2017-05-31 PROCEDURE — A9270 NON-COVERED ITEM OR SERVICE: HCPCS | Performed by: NURSE PRACTITIONER

## 2017-05-31 RX ADMIN — IBUPROFEN 800 MG: 800 TABLET, FILM COATED ORAL at 05:36

## 2017-05-31 RX ADMIN — Medication 1 TABLET: at 08:11

## 2017-05-31 RX ADMIN — OXYCODONE HYDROCHLORIDE AND ACETAMINOPHEN 1 TABLET: 5; 325 TABLET ORAL at 05:36

## 2017-05-31 ASSESSMENT — PAIN SCALES - GENERAL
PAINLEVEL_OUTOF10: 0
PAINLEVEL_OUTOF10: 6

## 2017-05-31 ASSESSMENT — LIFESTYLE VARIABLES: EVER_SMOKED: NEVER

## 2017-05-31 NOTE — PROGRESS NOTES
Discharge education reviewed and follow up instructions discussed.  Bands checked, cuddles removed, car seat checked.  Couplet left with father of the baby via hospital escort.

## 2017-05-31 NOTE — DISCHARGE INSTRUCTIONS
POSTPARTUM DISCHARGE INSTRUCTIONS FOR MOM    YOB: 1990   Age: 26 y.o.               Admit Date: 2017     Discharge Date: 2017  Attending Doctor:  Feli Hough*                  Allergies:  Review of patient's allergies indicates no known allergies.    Discharged to home by car. Discharged via wheelchair, hospital escort: Yes.  Special equipment needed: Not Applicable  Belongings with: Personal  Be sure to schedule a follow-up appointment with your primary care doctor or any specialists as instructed.     Discharge Plan:   Diet Plan: Discussed  Activity Level: Discussed  Confirmed Follow up Appointment: Appointment Scheduled  Confirmed Symptoms Management: Discussed  Medication Reconciliation Updated: No (Comments)  Influenza Vaccine Indication: Not indicated: Previously immunized this influenza season and > 8 years of age  Influenza Vaccine Given - only chart on this line when given: Influenza Vaccine Given (See MAR)    REASONS TO CALL YOUR OBSTETRICIAN:  1.   Persistent fever or shaking chills (Temperature higher than 100.4)  2.   Heavy bleeding (soaking more than 1 pad per hour); Passing clots  3.   Foul odor from vagina  4.   Mastitis (Breast infection; breast pain, chills, fever, redness)  5.   Urinary pain, burning or frequency  6.   Episiotomy infection  7.   Abdominal incision infection  8.   Severe depression longer than 24 hours    HAND WASHING  · Prior to handling the baby.  · Before breastfeeding or bottle feeding baby.  · After using the bathroom or changing the baby's diaper.    WOUND CARE  Ask your physician for additional care instructions.  In general:    ·  Incision:      · Keep clean and dry.    · Do NOT lift anything heavier than your baby for up to 6 weeks.    · There should not be any opening or pus.      VAGINAL CARE  · Nothing inside vagina for 6 weeks: no sexual intercourse, tampons or douching.  · Bleeding may continue for 2-4 weeks.  Amount may  "vary.    · Call your physician for heavy bleeding which means soaking more than 1 pad per hour    BIRTH CONTROL  · It is possible to become pregnant at any time after delivery and while breastfeeding.  · Plan to discuss a method of birth control with your physician at your follow up visit. visit.    DIET AND ELIMINATION  · Eating more fiber (bran cereal, fruits, and vegetables) and drinking plenty of fluids will help to avoid constipation.  · Urinary frequency after childbirth is normal.    POSTPARTUM BLUES  During the first few days after birth, you may experience a sense of the \"blues\" which may include impatience, irritability or even crying.  These feeling come and go quickly.  However, as many as 1 in 10 women experience emotional symptoms known as postpartum depression.    Postpartum depression:  May start as early as the second or third day after delivery or take several weeks or months to develop.  Symptoms of \"blues\" are present, but are more intense:  Crying spells; loss of appetite; feelings of hopelessness or loss of control; fear of touching the baby; over concern or no concern at all about the baby; little or no concern about your own appearance/caring for yourself; and/or inability to sleep or excessive sleeping.  Contact your physician if you are experiencing any of these symptoms.    Crisis Hotline:  · [place] Crisis Hotline:  6-780-PZLWGNX  Or 1-556.499.2729  · [place] Crisis Hotline:  1-571.818.3257  Or 371-854-0718    PREVENTING SHAKEN BABY:  If you are angry or stressed, PUT THE BABY IN THE CRIB, step away, take some deep breaths, and wait until you are calm to care for the baby.  DO NOT SHAKE THE BABY.  You are not alone, call a supporter for help.    · Crisis Call Center 24/7 crisis line 914-156-8244 or 1-556.219.2872  · You can also text them, text \"ANSWER\" to 119163    QUIT SMOKING/TOBACCO USE:  I understand the use of any tobacco products increases my chance of suffering from future heart " disease and could cause other illnesses which may shorten my life. Quitting the use of tobacco products is the single most important thing I can do to improve my health. For further information on smoking / tobacco cessation call a Toll Free Quit Line at 1-564.403.9014 (*National Cancer [place]) or 1-144.859.2157 (American Lung Association) or you can access the web based program at www.lungusa.org.    · [place] Tobacco Users Help Line:  (543) 630-4874       Toll Free: 1-897.176.5557  · Quit Tobacco Program [facility] (324)943-6334    DEPRESSION / SUICIDE RISK:  As you are discharged from this [facility], it is important to learn how to keep safe from harming yourself.    Recognize the warning signs:  · Abrupt changes in personality, positive or negative- including increase in energy   · Giving away possessions  · Change in eating patterns- significant weight changes-  positive or negative  · Change in sleeping patterns- unable to sleep or sleeping all the time   · Unwillingness or inability to communicate  · Depression  · Unusual sadness, discouragement and loneliness  · Talk of wanting to die  · Neglect of personal appearance   · Rebelliousness- reckless behavior  · Withdrawal from people/activities they love  · Confusion- inability to concentrate     If you or a loved one observes any of these behaviors or has concerns about self-harm, here's what you can do:  · Talk about it- your feelings and reasons for harming yourself  · Remove any means that you might use to hurt yourself (examples: pills, rope, extension cords, firearm)  · Get professional help from the community (Mental Health, Substance Abuse, psychological counseling)  · Do not be alone:Call your Safe Contact- someone whom you trust who will be there for you.  · Call your local CRISIS HOTLINE 681-2404 or 973-651-4616  · Call your local Children's Mobile Crisis Response Team [place] (507) 281-6787 or  www.[employer]Flunk.BookLending.com  · Call the toll free National Suicide Prevention Hotlines   · National Suicide Prevention Lifeline 119-225-XZRX (6701)  · National Hope Line Network 800-SUICIDE (497-7390)    DISCHARGE SURVEY:  Thank you for choosing [facility].  We hope we provided you with very good care.  You may be receiving a survey in the mail.  Please fill it out.  Your opinion is valuable to us.    ADDITIONAL EDUCATIONAL MATERIALS GIVEN TO PATIENT:        My signature on this form indicates that:  1.  I have reviewed and understand the above information  2.  My questions regarding this information have been answered to my satisfaction.  3.  I have formulated a plan with my discharge nurse to obtain my prescribed medication for home.

## 2017-05-31 NOTE — PROGRESS NOTES
Patient was clear for discharge yesterday however stayed an additional day secondary to  needs (open social work case). Pt remains stable and DC summary remains the same. Please see prior DC summary for pt medications and instructions    S: Doing well, pain is well controlled with current regimen, ambulating, tolerating PO nutrition and fluids, + void and BM, bleeding light, no complaints    O:  Filed Vitals:    17 0800 17 0800 17   BP: 115/54 119/72 123/74 126/73   Pulse: 75 66 91 83   Temp: 36.7 °C (98 °F) 36.7 °C (98 °F) 36.2 °C (97.2 °F) 36.8 °C (98.2 °F)   TempSrc:       Resp: 18 18 18 17   Height:       Weight:       SpO2: 95% 96% 98% 97%     General: Well appearing, NAD  Breast: No significant tenderness or erythem  CV: RRR, no m/r/g  Pulm: CTAB  Abdomen: Fundus firm, otherwise soft with + BS, incision well approximated, staples in place  Lochia: light  Ext: no significant edema, clubbing, or cyanosis    A/P: 27 y/o  POD #3 p c/s   -home today, infant d/c TBD by SS following home visit  -remove staples and place steri strips prior to d/c  -please see previous d/c summary for further details/instructions      Primitivo Torres M.D.

## 2017-05-31 NOTE — PROGRESS NOTES
Mother reports she has been pumping and dumping milk related to maternal positive drug screen, infant urine drug screen negative, awaiting results of meconium drug screen, mother reports she has [facility] WIC, states has Medela pump for home use, encouraged to follow-up with WIC for BF/pumping assistance as needed if meconium screen negative and if MD clears her to BF, educated on assistance available at [facility].

## 2017-05-31 NOTE — DISCHARGE PLANNING
:    Ongoing:  Re[facility] a [facility] from [facility] ([phone][facility] who went to parent's home this morning and stated the home checked out.  The parents are prepared for infant and the home is safe for infant to discharge to.  CPS has cleared infant to discharge home with parents.  SW will follow up on the meconium results and will notify CPS with the results.    Plan:  Per DSS, infant is cleared to discharge home with MOB.

## 2017-05-31 NOTE — CARE PLAN
Problem: Communication  Goal: The ability to communicate needs accurately and effectively will improve  Outcome: PROGRESSING AS EXPECTED  Pt. Ambulating, taking adequate PO fluids, voiding. Pt. Is pumping and bottle feeding infant.     Problem: Pain Management  Goal: Pain level will decrease to patient’s comfort goal  Outcome: PROGRESSING AS EXPECTED  Pt. Would like to call when she is in need for a Pain medication.

## 2017-05-31 NOTE — CARE PLAN
Problem: Alteration in comfort related to surgical incision and/or after birth pains  Goal: Patient is able to ambulate, care for self and infant with acceptable pain level  Patient is able to ambulate    Problem: Potential knowledge deficit related to lack of understanding of self and  care  Goal: Patient will verbalize understanding of self and infant care  Patient verbalizes understanding to care for self and infant.

## 2017-06-01 LAB
6MAM UR CFM-MCNC: <10 NG/ML
CODEINE UR CFM-MCNC: <20 NG/ML
HYDROCODONE UR CFM-MCNC: <20 NG/ML
HYDROMORPHONE UR CFM-MCNC: <20 NG/ML
MORPHINE UR CFM-MCNC: <20 NG/ML
NORHYDROCODONE UR CFM-MCNC: <20 NG/ML
NOROXYCODONE UR CFM-MCNC: 1743 NG/ML
OPIATES UR NOROXYM Q0836: 493 NG/ML
OXYCODONE UR CFM-MCNC: 1525 NG/ML
OXYMORPHONE UR CFM-MCNC: 40 NG/ML

## 2017-06-07 ENCOUNTER — POST PARTUM (OUTPATIENT)
Dept: OBGYN | Facility: CLINIC | Age: 27
End: 2017-06-07
Payer: MEDICAID

## 2017-06-07 VITALS — DIASTOLIC BLOOD PRESSURE: 70 MMHG | SYSTOLIC BLOOD PRESSURE: 120 MMHG | WEIGHT: 202 LBS | BODY MASS INDEX: 30.72 KG/M2

## 2017-06-07 DIAGNOSIS — Z98.890 POST-OPERATIVE STATE: ICD-10-CM

## 2017-06-07 PROCEDURE — 99024 POSTOP FOLLOW-UP VISIT: CPT | Performed by: OBSTETRICS & GYNECOLOGY

## 2017-06-07 NOTE — MR AVS SNAPSHOT
Laura Parks   2017 9:45 AM   Post Partum   MRN: [id number]    [facility]   Dept Phone:  [phone]    Description:  Female : 1990   Provider:  Christiana Theodore M.D.           Allergies as of 2017     No Known Allergies      You were diagnosed with     Post-operative state   [[id number]]         Vital Signs     Blood Pressure Weight Breastfeeding? Smoking Status          120/70 mmHg 91.627 kg (202 lb) Unknown Never Smoker         Basic Information     Date Of Birth Sex Race Ethnicity Preferred Language    1990 Female White  Origin (Trinidadian,Moldovan,Slovak,Mosotho, etc) English      Your appointments     2017  2:00 PM   Post Partum with STEPHANIA Alvarez   [facility] [address]  [facility] [address] [phone]              Problem List              ICD-10-CM Priority Class Noted - Resolved    Supervision of normal first pregnancy in first trimester Z34.01   2016 - Present    GBS (group B Streptococcus carrier), +RV culture, currently pregnant O99.820   5/15/2017 - Present    Indication for care in labor and delivery, antepartum O75.9   2017 - Present      Health Maintenance        Date Due Completion Dates    IMM HEP B VACCINE (1 of 3 - Primary Series) 1990 ---    IMM HEP A VACCINE (1 of 2 - Standard Series) 12/3/1992 ---    IMM HPV VACCINE (1 of 3 - Female 3 Dose Series) 12/3/2002 ---    IMM VARICELLA (CHICKENPOX) VACCINE (1 of 2 - 2 Dose Adolescent Series) 12/3/2004 ---    PAP SMEAR 2019    IMM DTaP/Tdap/Td Vaccine (2 - Td) 2027            Current Immunizations     Tdap Vaccine 2017  1:51 PM      Below and/or attached are the medications your provider expects you to take. Review all of your home medications and newly ordered medications with your provider and/or pharmacist. Follow medication instructions as directed by your provider and/or  pharmacist. Please keep your medication list with you and share with your provider. Update the information when medications are discontinued, doses are changed, or new medications (including over-the-counter products) are added; and carry medication information at all times in the event of emergency situations     Allergies:  No Known Allergies          Medications  Valid as of: June 07, 2017 - 10:57 AM    Generic Name Brand Name Tablet Size Instructions for use    Docusate Sodium (Cap)  MG Take 100 mg by mouth 2 times a day as needed for Constipation.        Folic Acid   Take  by mouth.        Oxycodone-Acetaminophen (Tab) PERCOCET 5-325 MG Take 1 Tab by mouth every four hours as needed (for Moderate Pain (Pain Scale 4-6) after delivery).        Prenatal MV-Min-Fe Fum-FA-DHA   Take  by mouth.        .                 Medicines prescribed today were sent to:     [facility] [address] [place] [address] - [address]    [address] [facility] [address]    Phone: [phone] Fax: [phone]    Open 24 Hours?: No      Medication refill instructions:       If your prescription bottle indicates you have medication refills left, it is not necessary to call your provider’s office. Please contact your pharmacy and they will refill your medication.    If your prescription bottle indicates you do not have any refills left, you may request refills at any time through one of the following ways: The online [employer] system (except Urgent Care), by calling your provider’s office, or by asking your pharmacy to contact your provider’s office with a refill request. Medication refills are processed only during regular business hours and may not be available until the next business day. Your provider may request additional information or to have a follow-up visit with you prior to refilling your medication.   *Please Note: Medication refills are assigned a new Rx number when refilled electronically. Your pharmacy may indicate that no  refills were authorized even though a new prescription for the same medication is available at the pharmacy. Please request the medicine by name with the pharmacy before contacting your provider for a refill.        Other Notes About Your Plan     SONYA Hill Access Code: Activation code not generated  Current Lisahart Status: Active

## 2017-06-07 NOTE — PROGRESS NOTES
Laura Richards Charly Is a 26 y.o.  status post  delivery on 17. Patient is here today for incision check. Currently the patient is without complaints.  She reports Normal  BM.  Normal  appetite without nausea and vomiting. She denies fever. Pain is under good control.    /70 mmHg  Wt 91.627 kg (202 lb)  Breastfeeding? Unknown  ABD Abdomen soft, non-tender. BS normal. No masses or organomegaly  Incision healing normally, dry and intact      Assessment and plan  Status post  delivery  No complications incision healing well  Followup in 4 weeks for final postpartum checkup

## 2017-06-07 NOTE — PROGRESS NOTES
Pt. Here today for C/S check delivered on 5/28/17  Currently :breast and bottle feeding   Pt. States no complaints

## 2017-09-02 ENCOUNTER — HOSPITAL ENCOUNTER (EMERGENCY)
Dept: HOSPITAL 8 - ED | Age: 27
End: 2017-09-02
Payer: COMMERCIAL

## 2017-09-02 VITALS — HEIGHT: 68 IN | BODY MASS INDEX: 32.61 KG/M2 | WEIGHT: 215.17 LBS

## 2017-09-02 VITALS — SYSTOLIC BLOOD PRESSURE: 135 MMHG | DIASTOLIC BLOOD PRESSURE: 60 MMHG

## 2017-09-02 DIAGNOSIS — R10.32: Primary | ICD-10-CM

## 2017-09-02 LAB
BUN SERPL-MCNC: 11 MG/DL (ref 7–18)
HCT VFR BLD CALC: 38.6 % (ref 34.6–47.8)
HGB BLD-MCNC: 12.8 G/DL (ref 11.7–16.4)
WBC # BLD AUTO: 9.9 X10^3/UL (ref 3.4–10)

## 2017-09-02 PROCEDURE — 96374 THER/PROPH/DIAG INJ IV PUSH: CPT

## 2017-09-02 PROCEDURE — 82040 ASSAY OF SERUM ALBUMIN: CPT

## 2017-09-02 PROCEDURE — 36415 COLL VENOUS BLD VENIPUNCTURE: CPT

## 2017-09-02 PROCEDURE — 86901 BLOOD TYPING SEROLOGIC RH(D): CPT

## 2017-09-02 PROCEDURE — 84702 CHORIONIC GONADOTROPIN TEST: CPT

## 2017-09-02 PROCEDURE — 96361 HYDRATE IV INFUSION ADD-ON: CPT

## 2017-09-02 PROCEDURE — 96375 TX/PRO/DX INJ NEW DRUG ADDON: CPT

## 2017-09-02 PROCEDURE — 76830 TRANSVAGINAL US NON-OB: CPT

## 2017-09-02 PROCEDURE — 81003 URINALYSIS AUTO W/O SCOPE: CPT

## 2017-09-02 PROCEDURE — 85025 COMPLETE CBC W/AUTO DIFF WBC: CPT

## 2017-09-02 PROCEDURE — 80048 BASIC METABOLIC PNL TOTAL CA: CPT

## 2017-09-02 PROCEDURE — 99285 EMERGENCY DEPT VISIT HI MDM: CPT

## 2017-09-02 PROCEDURE — 84703 CHORIONIC GONADOTROPIN ASSAY: CPT

## 2019-02-16 NOTE — PROGRESS NOTES
Pt has no complaints with cramping, bleeding or pain. +FM. US and AFP wnl - pt notified of results. 1hr GTT next visit. RTC 4 wk or sooner prn.    82 y/o male w / pmhx BPH, prostate ca, CAD, MI s/p stents, DM, GERD, HLD, HTN, and recently diagnosed colon mass who was scheduled for resection today but had an episode of hypoglycemia to 29 this morning, likely secondary to excessive insulin administration.and sx cancelled ans sx done yesterday       s/p colectomy/ileostomy  resolved hypoglycemia  fsg riss  no long acting insulin for now   mild iv flluids  would change fluid to ns when taking po   f/u bmp           cad  cont meds      mild hyponatremia /improved  f/u lytes  fluid restrict       dvt proph

## 2019-12-04 ENCOUNTER — HOSPITAL ENCOUNTER (EMERGENCY)
Facility: MEDICAL CENTER | Age: 29
End: 2019-12-04
Attending: EMERGENCY MEDICINE

## 2019-12-04 VITALS
HEART RATE: 76 BPM | BODY MASS INDEX: 37.21 KG/M2 | WEIGHT: 259.92 LBS | OXYGEN SATURATION: 100 % | SYSTOLIC BLOOD PRESSURE: 123 MMHG | HEIGHT: 70 IN | TEMPERATURE: 99.4 F | DIASTOLIC BLOOD PRESSURE: 82 MMHG | RESPIRATION RATE: 16 BRPM

## 2019-12-04 DIAGNOSIS — N12 PYELONEPHRITIS: ICD-10-CM

## 2019-12-04 LAB
ALBUMIN SERPL BCP-MCNC: 4.3 G/DL (ref 3.2–4.9)
ALBUMIN/GLOB SERPL: 1.1 G/DL
ALP SERPL-CCNC: 150 U/L (ref 30–99)
ALT SERPL-CCNC: 21 U/L (ref 2–50)
ANION GAP SERPL CALC-SCNC: 14 MMOL/L (ref 0–11.9)
APPEARANCE UR: ABNORMAL
AST SERPL-CCNC: 14 U/L (ref 12–45)
BACTERIA #/AREA URNS HPF: ABNORMAL /HPF
BASOPHILS # BLD AUTO: 0.4 % (ref 0–1.8)
BASOPHILS # BLD: 0.06 K/UL (ref 0–0.12)
BILIRUB SERPL-MCNC: 0.4 MG/DL (ref 0.1–1.5)
BILIRUB UR QL STRIP.AUTO: NEGATIVE
BUN SERPL-MCNC: 13 MG/DL (ref 8–22)
CALCIUM SERPL-MCNC: 9.8 MG/DL (ref 8.4–10.2)
CHLORIDE SERPL-SCNC: 105 MMOL/L (ref 96–112)
CO2 SERPL-SCNC: 22 MMOL/L (ref 20–33)
COLOR UR: YELLOW
CREAT SERPL-MCNC: 0.86 MG/DL (ref 0.5–1.4)
EOSINOPHIL # BLD AUTO: 0.13 K/UL (ref 0–0.51)
EOSINOPHIL NFR BLD: 0.8 % (ref 0–6.9)
EPI CELLS #/AREA URNS HPF: ABNORMAL /HPF
ERYTHROCYTE [DISTWIDTH] IN BLOOD BY AUTOMATED COUNT: 41 FL (ref 35.9–50)
GLOBULIN SER CALC-MCNC: 3.9 G/DL (ref 1.9–3.5)
GLUCOSE SERPL-MCNC: 101 MG/DL (ref 65–99)
GLUCOSE UR STRIP.AUTO-MCNC: NEGATIVE MG/DL
HCG SERPL QL: NEGATIVE
HCT VFR BLD AUTO: 43.9 % (ref 37–47)
HGB BLD-MCNC: 14.2 G/DL (ref 12–16)
IMM GRANULOCYTES # BLD AUTO: 0.08 K/UL (ref 0–0.11)
IMM GRANULOCYTES NFR BLD AUTO: 0.5 % (ref 0–0.9)
KETONES UR STRIP.AUTO-MCNC: NEGATIVE MG/DL
LEUKOCYTE ESTERASE UR QL STRIP.AUTO: ABNORMAL
LIPASE SERPL-CCNC: 20 U/L (ref 7–58)
LYMPHOCYTES # BLD AUTO: 2.65 K/UL (ref 1–4.8)
LYMPHOCYTES NFR BLD: 16.2 % (ref 22–41)
MCH RBC QN AUTO: 26.7 PG (ref 27–33)
MCHC RBC AUTO-ENTMCNC: 32.3 G/DL (ref 33.6–35)
MCV RBC AUTO: 82.7 FL (ref 81.4–97.8)
MICRO URNS: ABNORMAL
MONOCYTES # BLD AUTO: 0.91 K/UL (ref 0–0.85)
MONOCYTES NFR BLD AUTO: 5.6 % (ref 0–13.4)
NEUTROPHILS # BLD AUTO: 12.52 K/UL (ref 2–7.15)
NEUTROPHILS NFR BLD: 76.5 % (ref 44–72)
NITRITE UR QL STRIP.AUTO: POSITIVE
NRBC # BLD AUTO: 0 K/UL
NRBC BLD-RTO: 0 /100 WBC
PH UR STRIP.AUTO: 6 [PH] (ref 5–8)
PLATELET # BLD AUTO: 269 K/UL (ref 164–446)
PMV BLD AUTO: 9.7 FL (ref 9–12.9)
POTASSIUM SERPL-SCNC: 3.9 MMOL/L (ref 3.6–5.5)
PROT SERPL-MCNC: 8.2 G/DL (ref 6–8.2)
PROT UR QL STRIP: 30 MG/DL
RBC # BLD AUTO: 5.31 M/UL (ref 4.2–5.4)
RBC # URNS HPF: ABNORMAL /HPF
RBC UR QL AUTO: ABNORMAL
SODIUM SERPL-SCNC: 141 MMOL/L (ref 135–145)
SP GR UR STRIP.AUTO: 1.01
WBC # BLD AUTO: 16.4 K/UL (ref 4.8–10.8)
WBC #/AREA URNS HPF: ABNORMAL /HPF

## 2019-12-04 PROCEDURE — 81001 URINALYSIS AUTO W/SCOPE: CPT

## 2019-12-04 PROCEDURE — 83690 ASSAY OF LIPASE: CPT

## 2019-12-04 PROCEDURE — 85025 COMPLETE CBC W/AUTO DIFF WBC: CPT

## 2019-12-04 PROCEDURE — 99284 EMERGENCY DEPT VISIT MOD MDM: CPT

## 2019-12-04 PROCEDURE — 96365 THER/PROPH/DIAG IV INF INIT: CPT

## 2019-12-04 PROCEDURE — 36415 COLL VENOUS BLD VENIPUNCTURE: CPT

## 2019-12-04 PROCEDURE — 87077 CULTURE AEROBIC IDENTIFY: CPT

## 2019-12-04 PROCEDURE — 87186 SC STD MICRODIL/AGAR DIL: CPT

## 2019-12-04 PROCEDURE — 700111 HCHG RX REV CODE 636 W/ 250 OVERRIDE (IP): Performed by: EMERGENCY MEDICINE

## 2019-12-04 PROCEDURE — 84703 CHORIONIC GONADOTROPIN ASSAY: CPT

## 2019-12-04 PROCEDURE — 96375 TX/PRO/DX INJ NEW DRUG ADDON: CPT

## 2019-12-04 PROCEDURE — 87086 URINE CULTURE/COLONY COUNT: CPT

## 2019-12-04 PROCEDURE — 80053 COMPREHEN METABOLIC PANEL: CPT

## 2019-12-04 PROCEDURE — 700105 HCHG RX REV CODE 258: Performed by: EMERGENCY MEDICINE

## 2019-12-04 RX ORDER — ONDANSETRON 2 MG/ML
4 INJECTION INTRAMUSCULAR; INTRAVENOUS
Status: DISCONTINUED | OUTPATIENT
Start: 2019-12-04 | End: 2019-12-04 | Stop reason: HOSPADM

## 2019-12-04 RX ORDER — CEFDINIR 300 MG/1
300 CAPSULE ORAL 2 TIMES DAILY
Qty: 14 CAP | Refills: 0 | Status: SHIPPED | OUTPATIENT
Start: 2019-12-04 | End: 2023-10-14

## 2019-12-04 RX ORDER — MORPHINE SULFATE 4 MG/ML
4 INJECTION, SOLUTION INTRAMUSCULAR; INTRAVENOUS ONCE
Status: COMPLETED | OUTPATIENT
Start: 2019-12-04 | End: 2019-12-04

## 2019-12-04 RX ADMIN — MORPHINE SULFATE 4 MG: 4 INJECTION INTRAVENOUS at 20:42

## 2019-12-04 RX ADMIN — ONDANSETRON 4 MG: 2 INJECTION INTRAMUSCULAR; INTRAVENOUS at 20:42

## 2019-12-04 RX ADMIN — CEFTRIAXONE SODIUM 2 G: 2 INJECTION, POWDER, FOR SOLUTION INTRAMUSCULAR; INTRAVENOUS at 20:42

## 2019-12-04 NOTE — LETTER
12/7/2019               Laura Parks  [address] [place] [id number]        Dear Laura (MR#[id number])    This letter is sent in regards to your, recent visit to the [facility] Emergency Department on 12/4/2019.  During the visit, tests were performed to assist the physician in a medical diagnosis.  A review of those tests requires that we notify you of the following:    Your urine culture was POSITIVE for a bacteria called Escherichia coli. The antibiotic prescribed for you (cefdinir) should be active to treat this bacteria. IT IS IMPORTANT THAT YOU CONTINUE TAKING YOUR ANTIBIOTIC UNTIL IT IS FINISHED.      Please feel free to contact me at the number below if you have any questions or concerns. Thank you for your cooperation in the matter.    Sincerely,  ED Culture Follow-Up Staff  Danielle Levy, PharmD    [facility], Emergency Department  [address] [place] [address]  [phone](ED Culture Line)  [phone]

## 2019-12-05 NOTE — DISCHARGE INSTRUCTIONS
Pyelonephritis  (Urinary Tract Infection Involving the Kidney)    Start antibiotics tomorrow afternoon.  Take ibuprofen for fever and Tylenol for pain.  Return for uncontrolled vomiting, ill appearance or dizziness with standing.  See your doctor or return if not better in 2 days.  Take Zofran if needed for nausea or vomiting.    You had a borderline or high normal blood pressure reading today.  This does not necessarily mean you have hypertension.  Please followup with your/a primary physician for comprehensive blood pressure evaluation and yearly fasting cholesterol assessment.  BP Readings from Last 3 Encounters:   12/04/19 136/84   06/07/17 120/70   05/31/17 116/63         Pyelonephritis is an inflammation (soreness) of the kidney. It is generally caused by infection. It usually affects only one kidney. It may affect both. Usually these kidney infections have spread from the lower urinary tract. Because the urethra (the opening to the bladder) is much shorter in females than in males, urinary tract infections are much more common in females.    SYMPTOMS (WHAT SEEMS TO BE THE PROBLEM)  Usually infections of the kidney have an abrupt onset of chills and fever. There is often an ache in the flank (the back near the lower ribs). There is often a generalized malaise. There may be nausea (feeling sick to your stomach) and vomiting. Some times there are symptoms (problems) of cystitis (bladder infection and inflammation). These symptoms often include urgency, increased frequency of urination, and burning with urination.    Pyelonephritis will usually respond to antibiotics (medications that kill bacteria). When this illness is recognized and treated promptly, complications are not common. Hospitalization may be required. If treated at home, take all the medicine given to you until finished. You may feel better in a few days, but TAKE ALL THE MEDICINE or the infection may not respond. It can become more difficult to  treat. Response can generally be expected in 7 to 10 days.    Drink lots of fluid, 3 to 4 quarts a day. Cranberry juice is especially recommended, in addition to large amounts of water. Avoid caffeine, tea and carbonated beverages (Coke®, 7-Up®, etc.), because they tend to irritate the bladder. Males should avoid alcohol as this may irritate the prostate. Aspirin, ibuprofen (Advil® or Motrin®) or acetaminophen (Tylenol®) may be used for temperature and/or discomfort. Only use these if your caregiver has not given medications that interfere.    TO PREVENT FURTHER INFECTIONS:  Empty the bladder often. Avoid holding urine for long periods of time.  After a bowel movement, women should cleanse front to back. Only use each tissue once.  Empty the bladder before and after sexual intercourse.    If you develop an increase of back pain, fever, nausea, vomiting, or your symptoms are no better in three (3) days, seek medical attention. Seek medical attention sooner if you are getting worse.    Document Released: 12/18/2006    [employer]® Patient Information ©2008 [employer].

## 2019-12-05 NOTE — ED TRIAGE NOTES
Chief Complaint   Patient presents with   • Flank Pain     Onset of 3 weeks of right flank pain that radiates to her right abd for 3 weeks.   She also co UTI SX as well.

## 2019-12-05 NOTE — ED PROVIDER NOTES
"ED Provider Note    CHIEF COMPLAINT  Chief Complaint   Patient presents with   • Flank Pain     Onset of 3 weeks of right flank pain that radiates to her right abd for 3 weeks.       HPI  Laura Parks is a 29 y.o. female who presents for right flank pain 8-9 out of 10 in intensity present for the last 3 days.  Pain is constant and associated with dysuria and some low abdominal pain.  She has a history of a prior UTI.  There is no trigger modifying factor for her pain.  Denies fever but she does have nausea.  No vomiting or diarrhea.  No diabetes or pregnancy.  No other immune compromise.    REVIEW OF SYSTEMS  Pertinent positives include: Flank pain, nausea.  Pertinent negatives include: Diarrhea, constipation, urgency, frequency, kidney stones, hematuria.  10+ systems reviewed and negative.      PAST MEDICAL HISTORY  Rior UTI    FAMILY HISTORY  Family History   Problem Relation Age of Onset   • Thyroid Mother    • Diabetes Father    • Asthma Brother        SOCIAL HISTORY  Social History     Tobacco Use   • Smoking status: Never Smoker   Substance Use Topics   • Alcohol use: No     Comment: occ   • Drug use: No     Social History     Substance and Sexual Activity   Drug Use No       SURGICAL HISTORY  Past Surgical History:   Procedure Laterality Date   • PRIMARY C SECTION  5/28/2017    Procedure: PRIMARY C SECTION;  Surgeon: Luther Linder M.D.;  Location: LABOR AND DELIVERY;  Service:    • OTHER ORTHOPEDIC SURGERY  1996    L elbow surgery after fracture       CURRENT MEDICATIONS  See epic    ALLERGIES  No Known Allergies    PHYSICAL EXAM  VITAL SIGNS: /82   Pulse 76   Temp 37.4 °C (99.4 °F) (Temporal)   Resp 16   Ht 1.778 m (5' 10\")   Wt 117.9 kg (259 lb 14.8 oz)   SpO2 100%   BMI 37.29 kg/m²   Reviewed and high normal blood pressure, afebrile  Constitutional: Well developed, Well nourished, moderately overweight, appears to be in pain.  HENT: Normocephalic, atraumatic, bilateral " external ears normal, oropharynx moist, No exudates or erythema.   Eyes: PERRLA, conjunctiva pink, no scleral icterus.   Cardiovascular: Regular S1-S2 without murmur, rub, gallop.  Respiratory: No rales, rhonchi, wheeze.  Gastrointestinal: Soft, mild suprapubic tenderness, no McBurney's point tenderness or rebound.  No masses.  Skin: No erythema, no rash.   Genitourinary: Moderate right costovertebral angle tenderness.   Neurologic: Alert & oriented x 3, cranial nerves 2-12 intact by passive exam.  No focal deficit noted.  Psychiatric: Affect normal, Judgment normal, Mood normal.     DIFFERENTIAL DIAGNOSIS:  Pyelonephritis, renal colic, sepsis, doubt appendicitis, doubt pregnancy.      LABORATORY:  Results for orders placed or performed during the hospital encounter of 12/04/19   URINALYSIS,CULTURE IF INDICATED   Result Value Ref Range    Color Yellow     Character Cloudy (A)     Specific Gravity 1.015 <1.035    Ph 6.0 5.0 - 8.0    Glucose Negative Negative mg/dL    Ketones Negative Negative mg/dL    Protein 30 (A) Negative mg/dL    Bilirubin Negative Negative    Nitrite Positive (A) Negative    Leukocyte Esterase Moderate (A) Negative    Occult Blood Moderate (A) Negative    Micro Urine Req Microscopic    URINE MICROSCOPIC (W/UA)   Result Value Ref Range    WBC Packed (A) /hpf    RBC 0-2 /hpf    Bacteria Many (A) None /hpf    Epithelial Cells Few Few /hpf   CBC WITH DIFFERENTIAL   Result Value Ref Range    WBC 16.4 (H) 4.8 - 10.8 K/uL    RBC 5.31 4.20 - 5.40 M/uL    Hemoglobin 14.2 12.0 - 16.0 g/dL    Hematocrit 43.9 37.0 - 47.0 %    MCV 82.7 81.4 - 97.8 fL    MCH 26.7 (L) 27.0 - 33.0 pg    MCHC 32.3 (L) 33.6 - 35.0 g/dL    RDW 41.0 35.9 - 50.0 fL    Platelet Count 269 164 - 446 K/uL    MPV 9.7 9.0 - 12.9 fL    Neutrophils-Polys 76.50 (H) 44.00 - 72.00 %    Lymphocytes 16.20 (L) 22.00 - 41.00 %    Monocytes 5.60 0.00 - 13.40 %    Eosinophils 0.80 0.00 - 6.90 %    Basophils 0.40 0.00 - 1.80 %    Immature  Granulocytes 0.50 0.00 - 0.90 %    Nucleated RBC 0.00 /100 WBC    Neutrophils (Absolute) 12.52 (H) 2.00 - 7.15 K/uL    Lymphs (Absolute) 2.65 1.00 - 4.80 K/uL    Monos (Absolute) 0.91 (H) 0.00 - 0.85 K/uL    Eos (Absolute) 0.13 0.00 - 0.51 K/uL    Baso (Absolute) 0.06 0.00 - 0.12 K/uL    Immature Granulocytes (abs) 0.08 0.00 - 0.11 K/uL    NRBC (Absolute) 0.00 K/uL   COMP METABOLIC PANEL   Result Value Ref Range    Sodium 141 135 - 145 mmol/L    Potassium 3.9 3.6 - 5.5 mmol/L    Chloride 105 96 - 112 mmol/L    Co2 22 20 - 33 mmol/L    Anion Gap 14.0 (H) 0.0 - 11.9    Glucose 101 (H) 65 - 99 mg/dL    Bun 13 8 - 22 mg/dL    Creatinine 0.86 0.50 - 1.40 mg/dL    Calcium 9.8 8.4 - 10.2 mg/dL    AST(SGOT) 14 12 - 45 U/L    ALT(SGPT) 21 2 - 50 U/L    Alkaline Phosphatase 150 (H) 30 - 99 U/L    Total Bilirubin 0.4 0.1 - 1.5 mg/dL    Albumin 4.3 3.2 - 4.9 g/dL    Total Protein 8.2 6.0 - 8.2 g/dL    Globulin 3.9 (H) 1.9 - 3.5 g/dL    A-G Ratio 1.1 g/dL   LIPASE   Result Value Ref Range    Lipase 20 7 - 58 U/L   HCG QUAL SERUM   Result Value Ref Range    Beta-Hcg Qualitative Serum Negative Negative       INTERVENTIONS:  Medications   morphine (pf) 4 MG/ML injection 4 mg (4 mg Intravenous Given 12/4/19 2042)   cefTRIAXone (ROCEPHIN) 2 g in  mL IVPB (0 g Intravenous Stopped 12/4/19 2059)       COURSE & MEDICAL DECISION MAKING  Well-appearing patient presents with acute right pyelonephritis without evidence of sepsis pregnancy or diabetes.  Renal colic is unlikely as is appendicitis.  She is a good candidate for outpatient treatment.  Urine culture pending..    This patient has borderline or elevated blood pressure as recorded above and was instructed to followup with primary physician for comprehensive blood pressure evaluation and yearly fasting cholesterol assessment according to to CMS protocol.    PLAN:  Discharge Medication List as of 12/4/2019  8:59 PM      START taking these medications    Details   cefdinir  (OMNICEF) 300 MG Cap Take 1 Cap by mouth 2 times a day., Disp-14 Cap, R-0, Print Rx Paper           Ibuprofen and Tylenol  Declined Zofran  Pyelonephritis handout given  Return for uncontrolled vomiting, dizziness, persistent fever, ill appearance    [facility], Emergency Dept  [address]  [place] [id number]  [phone]  In 2 days  As needed if not better or if worsening      CONDITION: Stable.    FINAL IMPRESSION  1. Pyelonephritis          Electronically signed by: Amandeep Silva, 12/4/2019 11:41 PM

## 2019-12-07 LAB
BACTERIA UR CULT: ABNORMAL
BACTERIA UR CULT: ABNORMAL
SIGNIFICANT IND 70042: ABNORMAL
SITE SITE: ABNORMAL
SOURCE SOURCE: ABNORMAL

## 2019-12-07 NOTE — ED NOTES
"ED Positive Culture Follow-up/Notification Note:    Date: 12/7/19     Patient seen in the ED on 12/4/2019 for R flank pain, dysuria, nausea. H/o UTI. CVA on exam. Leukocytosis w/L shift on lab  1. Pyelonephritis       Discharge Medication List as of 12/4/2019  8:59 PM      START taking these medications    Details   cefdinir (OMNICEF) 300 MG Cap Take 1 Cap by mouth 2 times a day., Disp-14 Cap, R-0, Print Rx Paper             Allergies: Patient has no known allergies.     Vitals:    12/04/19 1837 12/04/19 1839 12/04/19 1842 12/04/19 2056   BP:   136/84 123/82   Pulse:  66  76   Resp:  16  16   Temp:  37.4 °C (99.4 °F)     TempSrc:  Temporal     SpO2:  100%     Weight: 117.9 kg (259 lb 14.8 oz)      Height: 1.778 m (5' 10\")          Final cultures:   Results     Procedure Component Value Units Date/Time    URINE CULTURE(NEW) [[id number]]  (Abnormal)  (Susceptibility) Collected:  12/04/19 1850    Order Status:  Completed Specimen:  Urine Updated:  12/07/19 0717     Significant Indicator POS     Source UR     Site -     Culture Result -      Escherichia coli  >100,000 cfu/mL      Susceptibility     Escherichia coli (1)     Antibiotic Interpretation Microscan Method Status    Ampicillin Resistant >16 mcg/mL MARCE Final    Ceftriaxone Sensitive <=1 mcg/mL MARCE Final    Ceftazidime Sensitive <=1 mcg/mL MARCE Final    Cefotaxime Sensitive <=2 mcg/mL MARCE Final    Cefazolin Intermediate 16 mcg/mL MARCE Final    Ciprofloxacin Sensitive <=1 mcg/mL MARCE Final    Ampicillin/sulbactam Resistant >16/8 mcg/mL MARCE Final    Cefepime Sensitive <=2 mcg/mL MARCE Final    Tobramycin Sensitive <=4 mcg/mL MARCE Final    Cefotetan Sensitive <=16 mcg/mL MARCE Final    Nitrofurantoin Sensitive <=32 mcg/mL MARCE Final    Gentamicin Sensitive <=4 mcg/mL MARCE Final    Levofloxacin Sensitive <=2 mcg/mL MARCE Final    Pip/Tazobactam Sensitive <=16 mcg/mL MARCE Final    Trimeth/Sulfa Sensitive <=2/38 mcg/mL MARCE Final                   URINALYSIS,CULTURE IF INDICATED " [[id number]]  (Abnormal) Collected:  12/04/19 1850    Order Status:  Completed Specimen:  Urine Updated:  12/04/19 1907     Color Yellow     Character Cloudy     Specific Gravity 1.015     Ph 6.0     Glucose Negative mg/dL      Ketones Negative mg/dL      Protein 30 mg/dL      Bilirubin Negative     Nitrite Positive     Leukocyte Esterase Moderate     Occult Blood Moderate     Micro Urine Req Microscopic    URINALYSIS,CULTURE IF INDICATED [[id number]]     Order Status:  Canceled Specimen:  Urine     URINE CULTURE(NEW) [[id number]]     Order Status:  Canceled           Plan:   Isolated organism susceptible to prescribed therapy.  Sent letter to patient to notify of positive culture result and encourage compliance with prescribed antibiotics.     Dainelle Levy

## 2020-10-25 ENCOUNTER — HOSPITAL ENCOUNTER (EMERGENCY)
Facility: MEDICAL CENTER | Age: 30
End: 2020-10-25
Attending: EMERGENCY MEDICINE

## 2020-10-25 VITALS
OXYGEN SATURATION: 98 % | RESPIRATION RATE: 18 BRPM | BODY MASS INDEX: 37.62 KG/M2 | TEMPERATURE: 98 F | WEIGHT: 262.79 LBS | DIASTOLIC BLOOD PRESSURE: 68 MMHG | HEIGHT: 70 IN | HEART RATE: 98 BPM | SYSTOLIC BLOOD PRESSURE: 130 MMHG

## 2020-10-25 DIAGNOSIS — R10.9 FLANK PAIN: ICD-10-CM

## 2020-10-25 DIAGNOSIS — N12 PYELONEPHRITIS: ICD-10-CM

## 2020-10-25 LAB
ALBUMIN SERPL BCP-MCNC: 4 G/DL (ref 3.2–4.9)
ALBUMIN/GLOB SERPL: 1.1 G/DL
ALP SERPL-CCNC: 139 U/L (ref 30–99)
ALT SERPL-CCNC: 14 U/L (ref 2–50)
ANION GAP SERPL CALC-SCNC: 13 MMOL/L (ref 7–16)
APPEARANCE UR: CLEAR
AST SERPL-CCNC: 19 U/L (ref 12–45)
BACTERIA #/AREA URNS HPF: ABNORMAL /HPF
BASOPHILS # BLD AUTO: 0.4 % (ref 0–1.8)
BASOPHILS # BLD: 0.07 K/UL (ref 0–0.12)
BILIRUB SERPL-MCNC: 0.2 MG/DL (ref 0.1–1.5)
BILIRUB UR QL STRIP.AUTO: ABNORMAL
BUN SERPL-MCNC: 17 MG/DL (ref 8–22)
CALCIUM SERPL-MCNC: 9.5 MG/DL (ref 8.5–10.5)
CHLORIDE SERPL-SCNC: 96 MMOL/L (ref 96–112)
CO2 SERPL-SCNC: 24 MMOL/L (ref 20–33)
COLOR UR: ABNORMAL
CREAT SERPL-MCNC: 0.96 MG/DL (ref 0.5–1.4)
EOSINOPHIL # BLD AUTO: 0.14 K/UL (ref 0–0.51)
EOSINOPHIL NFR BLD: 0.8 % (ref 0–6.9)
EPI CELLS #/AREA URNS HPF: NEGATIVE /HPF
ERYTHROCYTE [DISTWIDTH] IN BLOOD BY AUTOMATED COUNT: 40.3 FL (ref 35.9–50)
GLOBULIN SER CALC-MCNC: 3.7 G/DL (ref 1.9–3.5)
GLUCOSE SERPL-MCNC: 183 MG/DL (ref 65–99)
GLUCOSE UR STRIP.AUTO-MCNC: NEGATIVE MG/DL
HCT VFR BLD AUTO: 43.5 % (ref 37–47)
HGB BLD-MCNC: 14 G/DL (ref 12–16)
HYALINE CASTS #/AREA URNS LPF: ABNORMAL /LPF
IMM GRANULOCYTES # BLD AUTO: 0.11 K/UL (ref 0–0.11)
IMM GRANULOCYTES NFR BLD AUTO: 0.6 % (ref 0–0.9)
KETONES UR STRIP.AUTO-MCNC: NEGATIVE MG/DL
LEUKOCYTE ESTERASE UR QL STRIP.AUTO: ABNORMAL
LIPASE SERPL-CCNC: 25 U/L (ref 11–82)
LYMPHOCYTES # BLD AUTO: 2.27 K/UL (ref 1–4.8)
LYMPHOCYTES NFR BLD: 12.9 % (ref 22–41)
MCH RBC QN AUTO: 27.6 PG (ref 27–33)
MCHC RBC AUTO-ENTMCNC: 32.2 G/DL (ref 33.6–35)
MCV RBC AUTO: 85.6 FL (ref 81.4–97.8)
MICRO URNS: ABNORMAL
MONOCYTES # BLD AUTO: 0.71 K/UL (ref 0–0.85)
MONOCYTES NFR BLD AUTO: 4 % (ref 0–13.4)
NEUTROPHILS # BLD AUTO: 14.36 K/UL (ref 2–7.15)
NEUTROPHILS NFR BLD: 81.3 % (ref 44–72)
NITRITE UR QL STRIP.AUTO: POSITIVE
NRBC # BLD AUTO: 0 K/UL
NRBC BLD-RTO: 0 /100 WBC
PH UR STRIP.AUTO: 6 [PH] (ref 5–8)
PLATELET # BLD AUTO: 257 K/UL (ref 164–446)
PMV BLD AUTO: 9.8 FL (ref 9–12.9)
POTASSIUM SERPL-SCNC: 3.4 MMOL/L (ref 3.6–5.5)
PROT SERPL-MCNC: 7.7 G/DL (ref 6–8.2)
PROT UR QL STRIP: NEGATIVE MG/DL
RBC # BLD AUTO: 5.08 M/UL (ref 4.2–5.4)
RBC # URNS HPF: ABNORMAL /HPF
RBC UR QL AUTO: ABNORMAL
SODIUM SERPL-SCNC: 133 MMOL/L (ref 135–145)
SP GR UR STRIP.AUTO: 1.01
UROBILINOGEN UR STRIP.AUTO-MCNC: 1 MG/DL
WBC # BLD AUTO: 17.7 K/UL (ref 4.8–10.8)
WBC #/AREA URNS HPF: ABNORMAL /HPF

## 2020-10-25 PROCEDURE — 81001 URINALYSIS AUTO W/SCOPE: CPT

## 2020-10-25 PROCEDURE — 87086 URINE CULTURE/COLONY COUNT: CPT

## 2020-10-25 PROCEDURE — 83690 ASSAY OF LIPASE: CPT

## 2020-10-25 PROCEDURE — 700102 HCHG RX REV CODE 250 W/ 637 OVERRIDE(OP): Performed by: EMERGENCY MEDICINE

## 2020-10-25 PROCEDURE — 87077 CULTURE AEROBIC IDENTIFY: CPT

## 2020-10-25 PROCEDURE — 99283 EMERGENCY DEPT VISIT LOW MDM: CPT

## 2020-10-25 PROCEDURE — 87186 SC STD MICRODIL/AGAR DIL: CPT

## 2020-10-25 PROCEDURE — 80053 COMPREHEN METABOLIC PANEL: CPT

## 2020-10-25 PROCEDURE — 85025 COMPLETE CBC W/AUTO DIFF WBC: CPT

## 2020-10-25 PROCEDURE — A9270 NON-COVERED ITEM OR SERVICE: HCPCS | Performed by: EMERGENCY MEDICINE

## 2020-10-25 RX ORDER — SULFAMETHOXAZOLE AND TRIMETHOPRIM 800; 160 MG/1; MG/1
1 TABLET ORAL 2 TIMES DAILY
Qty: 20 TAB | Refills: 0 | Status: SHIPPED | OUTPATIENT
Start: 2020-10-25 | End: 2020-11-04

## 2020-10-25 RX ORDER — SULFAMETHOXAZOLE AND TRIMETHOPRIM 800; 160 MG/1; MG/1
1 TABLET ORAL ONCE
Status: COMPLETED | OUTPATIENT
Start: 2020-10-25 | End: 2020-10-25

## 2020-10-25 RX ADMIN — SULFAMETHOXAZOLE AND TRIMETHOPRIM 1 TABLET: 800; 160 TABLET ORAL at 18:42

## 2020-10-25 ASSESSMENT — LIFESTYLE VARIABLES: DO YOU DRINK ALCOHOL: NO

## 2020-10-25 ASSESSMENT — FIBROSIS 4 INDEX: FIB4 SCORE: 0.32

## 2020-10-25 NOTE — LETTER
10/31/2020               Laura Parks  [address] [place] [id number]        Dear Laura (MR#[id number])    This letter is sent in regards to your, recent visit to the [facility] Emergency Department on 10/25/2020.  During the visit, tests were performed to assist the physician in a medical diagnosis.  A review of those tests requires that we notify you of the following:    Your urine culture was POSITIVE for a bacteria called Proteus mirabilis. The antibiotic prescribed for you (sulfamethoxazole-trimethoprim) should be active to treat this bacteria. IT IS IMPORTANT THAT YOU CONTINUE TAKING YOUR ANTIBIOTIC UNTIL IT IS FINISHED.      Please feel free to contact me at the number below if you have any questions or concerns. Thank you for your cooperation in the matter.    Sincerely,  ED Culture Follow-Up Staff  Ruddy Ortega PharmD    [facility], Emergency Department  [address] [place] [id number]  [phone] (ED Culture Line)  [phone]

## 2020-10-26 NOTE — ED TRIAGE NOTES
Chief Complaint   Patient presents with   • Flank Pain     in right flank since thursday radiates to mid abdomen. describes as sharp. has hx of recurrent UTI. has nausesa denies vomiting. afebrile.   • Painful Urination     since thursday     Taking AZO w/o relief. Educated on triage process. Instructed to notify staff for any worsening symptoms. Denies any recent travel. Denies exposure to known covid positive patients. Denies any respiratory symptoms.

## 2020-10-26 NOTE — ED PROVIDER NOTES
"ED Provider Note    CHIEF COMPLAINT  Chief Complaint   Patient presents with   • Flank Pain     in right flank since thursday radiates to mid abdomen. describes as sharp. has hx of recurrent UTI. has nausesa denies vomiting. afebrile.   • Painful Urination     since thursday       HPI  Laura Parks is a 29 y.o. female who presents for evaluation of right-sided flank pain as well as suprapubic abdominal pain that has been going on since Friday, 3 days.  She has been nauseated but has not vomited, no fever.  She does have discomfort when she urinates.  The patient states that she has had 4 urinary tract infections already this year, similar symptoms.  No chest pain or shortness of breath.  History is otherwise unremarkable.  No other complaints offered at this time.    REVIEW OF SYSTEMS  Negative for fever, rash, chest pain, dyspnea, vomiting, diarrhea, headache. All other systems are negative.     PAST MEDICAL HISTORY  No past medical history on file.    FAMILY HISTORY  Family History   Problem Relation Age of Onset   • Thyroid Mother    • Diabetes Father    • Asthma Brother        SOCIAL HISTORY  Social History     Tobacco Use   • Smoking status: Never Smoker   Substance Use Topics   • Alcohol use: No     Comment: occ   • Drug use: No       SURGICAL HISTORY  Past Surgical History:   Procedure Laterality Date   • PRIMARY C SECTION  5/28/2017    Procedure: PRIMARY C SECTION;  Surgeon: Luther Linder M.D.;  Location: LABOR AND DELIVERY;  Service:    • OTHER ORTHOPEDIC SURGERY  1996    L elbow surgery after fracture       CURRENT MEDICATIONS  I personally reviewed the medication list in the charting documentation.     ALLERGIES  No Known Allergies    MEDICAL RECORD  I have reviewed patient's medical record and pertinent results are listed above.      PHYSICAL EXAM  VITAL SIGNS: /80   Pulse 89   Temp 36.8 °C (98.3 °F) (Temporal)   Resp 18   Ht 1.778 m (5' 10\")   Wt 119.2 kg (262 lb 12.6 oz)  "  SpO2 94%   BMI 37.71 kg/m²    Constitutional: Well appearing patient in no acute distress.  Not toxic, nor ill in appearance.  HENT: Normocephalic, no evidence of acute trauma.  Eyes: No scleral icterus. Normal conjunctiva   Neck: Supple, comfortable, nonpainful range of motion.   Cardiovascular: Regular heart rate and rhythm.   Thorax & Lungs: Chest is nontender.  Lungs are clear to auscultation with good air movement bilaterally.  No wheeze, rhonchi, nor rales.   Abdomen: Soft, nondistended, mild suprapubic tenderness without rebound or guarding  Skin: Warm, dry. No rash appreciated  Neurologic: Alert & oriented. No focal deficits observed.   Psychiatric: Normal affect appropriate for the clinical situation.    DIAGNOSTIC STUDIES / PROCEDURES    LABS/EKGs  Results for orders placed or performed during the hospital encounter of 10/25/20   CBC WITH DIFFERENTIAL   Result Value Ref Range    WBC 17.7 (H) 4.8 - 10.8 K/uL    RBC 5.08 4.20 - 5.40 M/uL    Hemoglobin 14.0 12.0 - 16.0 g/dL    Hematocrit 43.5 37.0 - 47.0 %    MCV 85.6 81.4 - 97.8 fL    MCH 27.6 27.0 - 33.0 pg    MCHC 32.2 (L) 33.6 - 35.0 g/dL    RDW 40.3 35.9 - 50.0 fL    Platelet Count 257 164 - 446 K/uL    MPV 9.8 9.0 - 12.9 fL    Neutrophils-Polys 81.30 (H) 44.00 - 72.00 %    Lymphocytes 12.90 (L) 22.00 - 41.00 %    Monocytes 4.00 0.00 - 13.40 %    Eosinophils 0.80 0.00 - 6.90 %    Basophils 0.40 0.00 - 1.80 %    Immature Granulocytes 0.60 0.00 - 0.90 %    Nucleated RBC 0.00 /100 WBC    Neutrophils (Absolute) 14.36 (H) 2.00 - 7.15 K/uL    Lymphs (Absolute) 2.27 1.00 - 4.80 K/uL    Monos (Absolute) 0.71 0.00 - 0.85 K/uL    Eos (Absolute) 0.14 0.00 - 0.51 K/uL    Baso (Absolute) 0.07 0.00 - 0.12 K/uL    Immature Granulocytes (abs) 0.11 0.00 - 0.11 K/uL    NRBC (Absolute) 0.00 K/uL   COMP METABOLIC PANEL   Result Value Ref Range    Sodium 133 (L) 135 - 145 mmol/L    Potassium 3.4 (L) 3.6 - 5.5 mmol/L    Chloride 96 96 - 112 mmol/L    Co2 24 20 - 33 mmol/L     Anion Gap 13.0 7.0 - 16.0    Glucose 183 (H) 65 - 99 mg/dL    Bun 17 8 - 22 mg/dL    Creatinine 0.96 0.50 - 1.40 mg/dL    Calcium 9.5 8.5 - 10.5 mg/dL    AST(SGOT) 19 12 - 45 U/L    ALT(SGPT) 14 2 - 50 U/L    Alkaline Phosphatase 139 (H) 30 - 99 U/L    Total Bilirubin 0.2 0.1 - 1.5 mg/dL    Albumin 4.0 3.2 - 4.9 g/dL    Total Protein 7.7 6.0 - 8.2 g/dL    Globulin 3.7 (H) 1.9 - 3.5 g/dL    A-G Ratio 1.1 g/dL   LIPASE   Result Value Ref Range    Lipase 25 11 - 82 U/L   URINALYSIS,CULTURE IF INDICATED    Specimen: Blood   Result Value Ref Range    Color DK Yellow     Character Clear     Specific Gravity 1.015 <1.035    Ph 6.0 5.0 - 8.0    Glucose Negative Negative mg/dL    Ketones Negative Negative mg/dL    Protein Negative Negative mg/dL    Bilirubin Small (A) Negative    Urobilinogen, Urine 1.0 Negative    Nitrite Positive (A) Negative    Leukocyte Esterase Moderate (A) Negative    Occult Blood Moderate (A) Negative    Micro Urine Req Microscopic    URINE MICROSCOPIC (W/UA)   Result Value Ref Range    WBC 20-50 (A) /hpf    RBC 10-20 (A) /hpf    Bacteria Moderate (A) None /hpf    Epithelial Cells Negative /hpf    Hyaline Cast 0-2 /lpf   ESTIMATED GFR   Result Value Ref Range    GFR If African American >60 >60 mL/min/1.73 m 2    GFR If Non African American >60 >60 mL/min/1.73 m 2        COURSE & MEDICAL DECISION MAKING  I have reviewed any medical record information, laboratory studies and radiographic results as noted above.  Differential diagnoses includes: Pyelonephritis, hepatitis, pancreatitis, biliary obstruction, dehydration, anemia, electrolyte abnormalities    Encounter Summary: This is a 29 y.o. female with right-sided flank pain and dysuria, history of recurring urinary tract infections, this is been going on for a few days.  Vital signs are within normal limits except for some hypertension.  Examination reveals some suprapubic tenderness.  Otherwise she is well-appearing.  Urinalysis is clearly  indicative of a urinary tract infection, nitrite positive with a microscopy consistent with infection.  Concerning for pyelonephritis given the location of her pain.  She will be treated with Bactrim as prior urine cultures have grown out E. coli that has been sensitive to Bactrim.  Other triage ordered blood work is significant for leukocytosis and slightly improved elevation in the alkaline phosphatase versus prior values.  At this point the patient is stable and appropriate for discharge and will follow up with her primary care provider and urologist given the fact that this is her fifth urinary tract infection this year.      DISPOSITION: Discharged home in stable condition      FINAL IMPRESSION  1. Pyelonephritis    2. Flank pain           This dictation was created using voice recognition software. The accuracy of the dictation is limited to the abilities of the software. I expect there may be some errors of grammar and possibly content. The nursing notes were reviewed and certain aspects of this information were incorporated into this note.    Electronically signed by: Deng York M.D., 10/25/2020 6:26 PM

## 2020-10-31 NOTE — ED NOTES
"ED Positive Culture Follow-up/Notification Note:    Date: 10/31/20     Patient seen in the ED on 10/25/2020 for R flank pain as well as suprapubic abd pain x ~3 days. Pt noted urinary discomfort and nausea upon presentation. Of note, pt had 4 urinary tract infections already this year w/ similar sx. Pt was afebrile upon presentation.  1. Pyelonephritis    2. Flank pain       Discharge Medication List as of 10/25/2020  7:11 PM      START taking these medications    Details   sulfamethoxazole-trimethoprim (BACTRIM DS) 800-160 MG tablet Take 1 Tab by mouth 2 times a day for 10 days., Disp-20 Tab,R-0, Normal             Allergies: Patient has no known allergies.     Vitals:    10/25/20 1741 10/25/20 1807 10/25/20 1830 10/25/20 1905   BP: 147/89 134/80 136/83 130/68   Pulse: 97 89 92 98   Resp: 16 18 18 18   Temp: 36.8 °C (98.3 °F)   36.7 °C (98 °F)   TempSrc: Temporal   Temporal   SpO2: 94%  97% 98%   Weight: 119.2 kg (262 lb 12.6 oz)      Height: 1.778 m (5' 10\")          Final cultures:   Results     Procedure Component Value Units Date/Time    URINE CULTURE(NEW) [[id number]]  (Abnormal)  (Susceptibility) Collected: 10/25/20 1751    Order Status: Completed Specimen: Urine Updated: 10/28/20 0725     Significant Indicator POS     Source UR     Site -     Culture Result -      Proteus mirabilis  ,000 cfu/mL      Susceptibility     Proteus mirabilis (1)     Antibiotic Interpretation Microscan Method Status    Ampicillin Sensitive <=8 mcg/mL MARCE Final    Ceftriaxone Sensitive <=1 mcg/mL MARCE Final    Ceftazidime Sensitive <=1 mcg/mL MARCE Final    Cefotaxime Sensitive <=2 mcg/mL MARCE Final    Cefazolin Sensitive <=2 mcg/mL MARCE Final    Ciprofloxacin Sensitive <=1 mcg/mL MARCE Final    Ampicillin/sulbactam Sensitive <=8/4 mcg/mL MARCE Final    Cefepime Sensitive <=2 mcg/mL MARCE Final    Tobramycin Sensitive <=4 mcg/mL MARCE Final    Cefotetan Sensitive <=16 mcg/mL MARCE Final    Nitrofurantoin Resistant >64 mcg/mL MARCE Final    " Gentamicin Sensitive <=4 mcg/mL MARCE Final    Levofloxacin Sensitive <=2 mcg/mL MARCE Final    Pip/Tazobactam Sensitive <=16 mcg/mL MARCE Final    Trimeth/Sulfa Sensitive <=2/38 mcg/mL MARCE Final                   URINALYSIS,CULTURE IF INDICATED [[id number]]  (Abnormal) Collected: 10/25/20 1751    Order Status: Completed Specimen: Blood Updated: 10/25/20 1811     Color DK Yellow     Character Clear     Specific Gravity 1.015     Ph 6.0     Glucose Negative mg/dL      Ketones Negative mg/dL      Protein Negative mg/dL      Bilirubin Small     Urobilinogen, Urine 1.0     Nitrite Positive     Leukocyte Esterase Moderate     Occult Blood Moderate     Micro Urine Req Microscopic          Plan:   Appropriate antibiotic therapy prescribed. No changes required based upon culture result.  Sent letter to patient to notify of positive culture result and encourage compliance with prescribed antibiotics.     Ruddy Ortega, PharmD

## 2021-10-23 PROCEDURE — 99284 EMERGENCY DEPT VISIT MOD MDM: CPT

## 2021-10-23 ASSESSMENT — FIBROSIS 4 INDEX: FIB4 SCORE: 0.57

## 2021-10-24 ENCOUNTER — HOSPITAL ENCOUNTER (EMERGENCY)
Facility: MEDICAL CENTER | Age: 31
End: 2021-10-24
Attending: EMERGENCY MEDICINE

## 2021-10-24 VITALS
OXYGEN SATURATION: 99 % | DIASTOLIC BLOOD PRESSURE: 87 MMHG | SYSTOLIC BLOOD PRESSURE: 121 MMHG | HEART RATE: 71 BPM | BODY MASS INDEX: 40.24 KG/M2 | HEIGHT: 70 IN | TEMPERATURE: 97.8 F | RESPIRATION RATE: 16 BRPM | WEIGHT: 281.09 LBS

## 2021-10-24 DIAGNOSIS — N12 PYELONEPHRITIS OF RIGHT KIDNEY: ICD-10-CM

## 2021-10-24 DIAGNOSIS — N39.0 ACUTE UTI: ICD-10-CM

## 2021-10-24 LAB
APPEARANCE UR: ABNORMAL
BACTERIA #/AREA URNS HPF: ABNORMAL /HPF
BILIRUB UR QL STRIP.AUTO: NEGATIVE
COLOR UR: YELLOW
EPI CELLS #/AREA URNS HPF: ABNORMAL /HPF
GLUCOSE UR STRIP.AUTO-MCNC: NEGATIVE MG/DL
HCG UR QL: NEGATIVE
KETONES UR STRIP.AUTO-MCNC: NEGATIVE MG/DL
LEUKOCYTE ESTERASE UR QL STRIP.AUTO: ABNORMAL
MICRO URNS: ABNORMAL
NITRITE UR QL STRIP.AUTO: NEGATIVE
PH UR STRIP.AUTO: 6.5 [PH] (ref 5–8)
PROT UR QL STRIP: NEGATIVE MG/DL
RBC # URNS HPF: ABNORMAL /HPF
RBC UR QL AUTO: ABNORMAL
SP GR UR STRIP.AUTO: 1.02
WBC #/AREA URNS HPF: ABNORMAL /HPF

## 2021-10-24 PROCEDURE — 87086 URINE CULTURE/COLONY COUNT: CPT

## 2021-10-24 PROCEDURE — 96375 TX/PRO/DX INJ NEW DRUG ADDON: CPT

## 2021-10-24 PROCEDURE — 700111 HCHG RX REV CODE 636 W/ 250 OVERRIDE (IP): Performed by: EMERGENCY MEDICINE

## 2021-10-24 PROCEDURE — 81001 URINALYSIS AUTO W/SCOPE: CPT

## 2021-10-24 PROCEDURE — 96374 THER/PROPH/DIAG INJ IV PUSH: CPT

## 2021-10-24 PROCEDURE — 700105 HCHG RX REV CODE 258: Performed by: EMERGENCY MEDICINE

## 2021-10-24 PROCEDURE — 87186 SC STD MICRODIL/AGAR DIL: CPT

## 2021-10-24 PROCEDURE — 87077 CULTURE AEROBIC IDENTIFY: CPT

## 2021-10-24 PROCEDURE — 81025 URINE PREGNANCY TEST: CPT

## 2021-10-24 RX ORDER — SODIUM CHLORIDE 9 MG/ML
1000 INJECTION, SOLUTION INTRAVENOUS ONCE
Status: COMPLETED | OUTPATIENT
Start: 2021-10-24 | End: 2021-10-24

## 2021-10-24 RX ORDER — ONDANSETRON 4 MG/1
4 TABLET, ORALLY DISINTEGRATING ORAL EVERY 6 HOURS PRN
Qty: 10 TABLET | Refills: 0 | Status: SHIPPED | OUTPATIENT
Start: 2021-10-24 | End: 2023-10-14

## 2021-10-24 RX ORDER — CEFTRIAXONE 2 G/1
2 INJECTION, POWDER, FOR SOLUTION INTRAMUSCULAR; INTRAVENOUS ONCE
Status: COMPLETED | OUTPATIENT
Start: 2021-10-24 | End: 2021-10-24

## 2021-10-24 RX ORDER — KETOROLAC TROMETHAMINE 10 MG/1
10 TABLET, FILM COATED ORAL 3 TIMES DAILY PRN
Qty: 15 TABLET | Refills: 0 | Status: SHIPPED | OUTPATIENT
Start: 2021-10-24 | End: 2023-10-14

## 2021-10-24 RX ORDER — CEPHALEXIN 500 MG/1
500 CAPSULE ORAL 4 TIMES DAILY
Qty: 40 CAPSULE | Refills: 0 | Status: SHIPPED | OUTPATIENT
Start: 2021-10-24 | End: 2023-10-14

## 2021-10-24 RX ORDER — KETOROLAC TROMETHAMINE 30 MG/ML
30 INJECTION, SOLUTION INTRAMUSCULAR; INTRAVENOUS ONCE
Status: COMPLETED | OUTPATIENT
Start: 2021-10-24 | End: 2021-10-24

## 2021-10-24 RX ADMIN — KETOROLAC TROMETHAMINE 30 MG: 30 INJECTION, SOLUTION INTRAMUSCULAR at 02:57

## 2021-10-24 RX ADMIN — CEFTRIAXONE SODIUM 2 G: 2 INJECTION, POWDER, FOR SOLUTION INTRAMUSCULAR; INTRAVENOUS at 02:48

## 2021-10-24 RX ADMIN — SODIUM CHLORIDE 1000 ML: 9 INJECTION, SOLUTION INTRAVENOUS at 02:47

## 2021-10-24 NOTE — ED PROVIDER NOTES
ED Provider Note     Scribed for Jenna De Leon D.O. by Dre Mcgregor. 10/24/2021, 2:32 AM.     Primary care provider: Pcp Pt States None  Means of arrival: Walk-in         History obtained from: Patient  History limited by: None    CHIEF COMPLAINT  Chief Complaint   Patient presents with   • Painful Urination     started three days ago   • Low Back Pain     x 1 week     HPI  Laura Aden Caro is a 30 y.o. female who presents to the emergency Department for an acute, mild lower right back pain onset a week ago. The patient states that she additionally has dysuria that started 3 days ago. Denies chills or vomiting. No alleviating or exacerbating factors reported.    REVIEW OF SYSTEMS  Pertinent positives include lower right back pain and dysuria. Pertinent negatives include no chills or vomiting.   See HPI for further details. All other systems are negative.    PAST MEDICAL HISTORY  Past Medical History:   Diagnosis Date   • Patient denies medical problems        FAMILY HISTORY  Family History   Problem Relation Age of Onset   • Thyroid Mother    • Diabetes Father    • Asthma Brother        SOCIAL HISTORY  Social History     Tobacco Use   • Smoking status: Never Smoker   Substance Use Topics   • Alcohol use: Yes   • Drug use: No      Social History     Substance and Sexual Activity   Drug Use No       SURGICAL HISTORY  Past Surgical History:   Procedure Laterality Date   • PRIMARY C SECTION  5/28/2017    Procedure: PRIMARY C SECTION;  Surgeon: Luther Linder M.D.;  Location: LABOR AND DELIVERY;  Service:    • OTHER ORTHOPEDIC SURGERY  1996    L elbow surgery after fracture       CURRENT MEDICATIONS  No current facility-administered medications for this encounter.    Current Outpatient Medications:   •  cephALEXin (KEFLEX) 500 MG Cap, Take 1 Capsule by mouth 4 times a day., Disp: 40 Capsule, Rfl: 0  •  ondansetron (ZOFRAN ODT) 4 MG TABLET DISPERSIBLE, Take 1 Tablet by mouth every 6 hours as needed for  "Nausea., Disp: 10 Tablet, Rfl: 0  •  ketorolac (TORADOL) 10 MG Tab, Take 1 Tablet by mouth 3 times a day as needed for Moderate Pain. With food, Disp: 15 Tablet, Rfl: 0  •  cefdinir (OMNICEF) 300 MG Cap, Take 1 Cap by mouth 2 times a day., Disp: 14 Cap, Rfl: 0  •  oxycodone-acetaminophen (PERCOCET) 5-325 MG Tab, Take 1 Tab by mouth every four hours as needed (for Moderate Pain (Pain Scale 4-6) after delivery)., Disp: 30 Tab, Rfl: 0  •  docusate sodium 100 MG Cap, Take 100 mg by mouth 2 times a day as needed for Constipation., Disp: 60 Cap, Rfl: 1  •  Prenatal MV-Min-Fe Fum-FA-DHA (PRENATAL 1 PO), Take  by mouth., Disp: , Rfl:   •  FOLIC ACID PO, Take  by mouth., Disp: , Rfl:     ALLERGIES  No Known Allergies    PHYSICAL EXAM  VITAL SIGNS: /92   Pulse 78   Temp 36.2 °C (97.2 °F) (Oral)   Resp 16   Ht 1.778 m (5' 10\")   Wt (!) 127 kg (281 lb 1.4 oz)   SpO2 100%   BMI 40.33 kg/m²     Constitutional: Patient is well developed, well nourished. Non-toxic appearing.  Mild distress.  HENT: Normocephalic, atraumatic.  Oral mucosa moist.  Cardiovascular: Normal heart rate and Regular rhythm. No murmur  Thorax & Lungs: Clear and equal breath sounds with good excursion. No respiratory distress, no rhonchi, wheezing or rales.  Abdomen: Significant right flank pain.  Normal bowel sounds, no rebound, guarding, flank tenderness, masses.  Skin: Warm, Dry, No erythema, No rashes.   Back: Significant right flank pain.  No midline cervical, thoracic or lumbosacral tenderness.  Extremities: Peripheral pulses 4/4, normal range of motion with no tenderness.  Neurologic: Alert & oriented x 3, Normal motor function, Normal sensory function  Psychiatric: Affect normal, Judgment normal, Mood normal.     DIAGNOSTICS/PROCEDURES  Results for orders placed or performed during the hospital encounter of 10/24/21   URINALYSIS CULTURE, IF INDICATED    Specimen: Urine   Result Value Ref Range    Color Yellow     Character Hazy (A)     " Specific Gravity 1.020 <1.035    Ph 6.5 5.0 - 8.0    Glucose Negative Negative mg/dL    Ketones Negative Negative mg/dL    Protein Negative Negative mg/dL    Bilirubin Negative Negative    Nitrite Negative Negative    Leukocyte Esterase Trace (A) Negative    Occult Blood Small (A) Negative    Micro Urine Req Microscopic    HCG QUALITATIVE UR   Result Value Ref Range    Beta-Hcg Urine Negative Negative   URINE MICROSCOPIC (W/UA)   Result Value Ref Range    WBC 10-20 (A) /hpf    RBC 2-5 (A) /hpf    Bacteria Few (A) None /hpf    Epithelial Cells Few Few /hpf   URINE CULTURE(NEW)    Specimen: Urine   Result Value Ref Range    Significant Indicator NEG     Source UR     Site -     Culture Result -        COURSE & MEDICAL DECISION MAKING  Pertinent Labs & Imaging studies reviewed. (See chart for details)    2:32 AM - Patient seen and evaluated at bedside. Ordered for UA Culture if indicated, HCG Qual UR, Urine Microscopic (w/ UA), and Urine Culture to evaluate. Patient will be treated with Rocephin 2g injection, Toradol 30mg injection, and IV Fluids for her symptoms. Differential diagnoses include, but are not limited to, kidney stone, kidney infection  Laboratories were positive for urinary tract infection    4:27 AM - Patient was reevaluated at bedside. I informed her that her white blood cell count is elevated, indicating an infection. Discussed plans and precautions for discharge. Patient understands and verbalizes agreement to the plan of discharge.    HYDRATION: Based on the patient's presentation of kidney infection the patient was given IV fluids. IV Hydration was used because oral hydration was not adequate alone. Upon recheck following hydration, the patient was improved.      The patient will return for new or worsening symptoms and is stable at the time of discharge.    DISPOSITION:  Patient will be discharged home in stable condition.    FOLLOW UP:  [facility] [address]  [address] [place] [address], [address]  [place] [address]  [phone]  Schedule an appointment as soon as possible for a visit in 1 week  For urine recheck and further evaluation    OUTPATIENT MEDICATIONS:  New Prescriptions    CEPHALEXIN (KEFLEX) 500 MG CAP    Take 1 Capsule by mouth 4 times a day.    KETOROLAC (TORADOL) 10 MG TAB    Take 1 Tablet by mouth 3 times a day as needed for Moderate Pain. With food    ONDANSETRON (ZOFRAN ODT) 4 MG TABLET DISPERSIBLE    Take 1 Tablet by mouth every 6 hours as needed for Nausea.     FINAL IMPRESSION  1. Pyelonephritis of right kidney    2. Acute UTI      Dre MEJÍA (Scribe), am scribing for, and in the presence of, Jenna De Leon D.O..    Electronically signed by: Dre Mcgregor (Dylanibmaryam), 10/24/2021    Jenna MEJÍA D.O. personally performed the services described in this documentation, as scribed by Dre Mcgregor in my presence, and it is both accurate and complete.    The note accurately reflects work and decisions made by me.  Jenna De Leon D.O.  10/24/2021  9:06 AM

## 2021-10-24 NOTE — ED NOTES
Reviewed discharge instructions and prescriptions x 3 w/ pt, verbalized understanding to information provided including follow up care, return precautions and medications, denied questions/concerns.  Pt ambulated from ED.

## 2021-10-24 NOTE — ED TRIAGE NOTES
"Chief Complaint   Patient presents with   • Painful Urination     started three days ago   • Low Back Pain     x 1 week     /92   Pulse 78   Temp 36.2 °C (97.2 °F) (Oral)   Resp 16   Ht 1.778 m (5' 10\")   Wt (!) 127 kg (281 lb 1.4 oz)   SpO2 100%   BMI 40.33 kg/m²     Has this patient been vaccinated for COVID NO  If not, would they like to be vaccinated while in the ER if eligible?  NO  Would the patient like to speak with the ERP about the possibility of receiving the COVID vaccine today before making a decision? NO  "

## 2021-10-24 NOTE — DISCHARGE INSTRUCTIONS
Complete bedrest with increase fluids at least 1 gallon of water per day  Tylenol if fever greater than 101  Take your antibiotics until completely gone  Ibuprofen for back and body aches  Cranberry juice or cranberry tablets for the next 3 to 4 days  Follow-up with your primary care provider within 1 week for urine recheck  Return to the ER if persistent vomiting, uncontrolled fever or uncontrolled pain.

## 2021-10-28 NOTE — ED NOTES
"ED Positive Culture Follow-up/Notification Note:    Date: 10/28/2021     Patient seen in the ED on 10/23/2021 for mild lower back pain and dysuria.     1. Pyelonephritis of right kidney    2. Acute UTI       Discharge Medication List as of 10/24/2021  4:27 AM      START taking these medications    Details   cephALEXin (KEFLEX) 500 MG Cap Take 1 Capsule by mouth 4 times a day., Disp-40 Capsule, R-0, Normal      ondansetron (ZOFRAN ODT) 4 MG TABLET DISPERSIBLE Take 1 Tablet by mouth every 6 hours as needed for Nausea., Disp-10 Tablet, R-0, Normal      ketorolac (TORADOL) 10 MG Tab Take 1 Tablet by mouth 3 times a day as needed for Moderate Pain. With food, Disp-15 Tablet, R-0, Normal             Allergies: Patient has no known allergies.     Vitals:    10/23/21 2356 10/24/21 0426   BP: 146/92 121/87   Pulse: 78 71   Resp: 16 16   Temp: 36.2 °C (97.2 °F) 36.6 °C (97.8 °F)   TempSrc: Oral Temporal   SpO2: 100% 99%   Weight: (!) 127 kg (281 lb 1.4 oz)    Height: 1.778 m (5' 10\")        Final cultures:   Results     Procedure Component Value Units Date/Time    URINE CULTURE(NEW) [[id number]]  (Abnormal)  (Susceptibility) Collected: 10/24/21 0018    Order Status: Completed Specimen: Urine Updated: 10/26/21 0915     Significant Indicator POS     Source UR     Site -     Culture Result -      Proteus mirabilis  ,000 cfu/mL      Narrative:      Indication for culture:->Patient WITHOUT an indwelling [place]  catheter in place with new onset of Dysuria, Frequency,  Urgency, and/or Suprapubic pain    Susceptibility     Proteus mirabilis (1)     Antibiotic Interpretation Microscan Method Status    Amikacin  [*]  Sensitive <=16 mcg/mL MARCE Final    Ampicillin Sensitive <=8 mcg/mL MARCE Final    Amoxicillin/CA  [*]  Sensitive <=8/4 mcg/mL MARCE Final    Aztreonam  [*]  Sensitive <=4 mcg/mL MARCE Final    Ceftolozane+Tazobactam  [*]  Sensitive <=2 mcg/mL MARCE Final    Ceftriaxone Sensitive <=1 mcg/mL MARCE Final    Ceftazidime  [*]  " Sensitive <=1 mcg/mL MARCE Final    Cefazolin Sensitive <=2 mcg/mL MARCE Final    Ciprofloxacin Sensitive <=0.25 mcg/mL MARCE Final    Cefepime Sensitive <=2 mcg/mL MARCE Final    Ampicillin/sulbactam Sensitive <=4/2 mcg/mL MARCE Final    Cefuroxime Sensitive <=4 mcg/mL MARCE Final    Tobramycin Sensitive <=2 mcg/mL MARCE Final    Ertapenem  [*]  Sensitive <=0.5 mcg/mL MARCE Final    Nitrofurantoin Resistant >64 mcg/mL MARCE Final    Gentamicin Sensitive <=2 mcg/mL MARCE Final    Levofloxacin Sensitive <=0.5 mcg/mL MARCE Final    Meropenem  [*]  Sensitive <=1 mcg/mL MARCE Final    Meropenem/Vaborbactam  [*]  Sensitive <=2 mcg/mL MARCE Final    Pip/Tazobactam Sensitive <=8 mcg/mL MARCE Final    Trimeth/Sulfa Sensitive <=0.5/9.5 mcg/mL MARCE Final    Tetracycline  [*]  Resistant >8 mcg/mL MARCE Final           [*]  Suppressed Antibiotic                 URINALYSIS CULTURE, IF INDICATED [[id number]]  (Abnormal) Collected: 10/24/21 0018    Order Status: Completed Specimen: Urine Updated: 10/24/21 0048     Color Yellow     Character Hazy     Specific Gravity 1.020     Ph 6.5     Glucose Negative mg/dL      Ketones Negative mg/dL      Protein Negative mg/dL      Bilirubin Negative     Nitrite Negative     Leukocyte Esterase Trace     Occult Blood Small     Micro Urine Req Microscopic    Narrative:      Indication for culture:->Patient WITHOUT an indwelling [place]  catheter in place with new onset of Dysuria, Frequency,  Urgency, and/or Suprapubic pain          Plan:   Discussed culture results with patient, reports that her symptoms have improved. She notes some minor back pain, but denies dysuria, fevers or chills. Proteus is an AmpC inducer, however in this case since patient is improving on cephalexin, ressitance unlikely. Appropriate antibiotic therapy prescribed. No changes required based upon culture result. Encouraged compliance with antibiotics. If back pain persists or worsens, advised patient to return to the ER or seek medical attention. Patient  voiced understanding.       Yamileth Escoto, PharmD  PGY2 Infectious Diseases Pharmacy Resident

## 2022-10-27 ENCOUNTER — HOSPITAL ENCOUNTER (EMERGENCY)
Facility: MEDICAL CENTER | Age: 32
End: 2022-10-27
Attending: EMERGENCY MEDICINE

## 2022-10-27 VITALS
DIASTOLIC BLOOD PRESSURE: 71 MMHG | WEIGHT: 284.61 LBS | RESPIRATION RATE: 19 BRPM | OXYGEN SATURATION: 95 % | SYSTOLIC BLOOD PRESSURE: 127 MMHG | HEART RATE: 78 BPM | TEMPERATURE: 97 F | BODY MASS INDEX: 40.75 KG/M2 | HEIGHT: 70 IN

## 2022-10-27 DIAGNOSIS — D72.829 LEUKOCYTOSIS, UNSPECIFIED TYPE: ICD-10-CM

## 2022-10-27 DIAGNOSIS — N12 PYELONEPHRITIS: Primary | ICD-10-CM

## 2022-10-27 DIAGNOSIS — N39.0 ACUTE UTI: ICD-10-CM

## 2022-10-27 LAB
ALBUMIN SERPL BCP-MCNC: 4.3 G/DL (ref 3.2–4.9)
ALBUMIN/GLOB SERPL: 1.2 G/DL
ALP SERPL-CCNC: 128 U/L (ref 30–99)
ALT SERPL-CCNC: 37 U/L (ref 2–50)
ANION GAP SERPL CALC-SCNC: 10 MMOL/L (ref 7–16)
APPEARANCE UR: ABNORMAL
AST SERPL-CCNC: 24 U/L (ref 12–45)
BACTERIA #/AREA URNS HPF: ABNORMAL /HPF
BASOPHILS # BLD AUTO: 0.5 % (ref 0–1.8)
BASOPHILS # BLD: 0.07 K/UL (ref 0–0.12)
BILIRUB SERPL-MCNC: 0.5 MG/DL (ref 0.1–1.5)
BILIRUB UR QL STRIP.AUTO: NEGATIVE
BUN SERPL-MCNC: 17 MG/DL (ref 8–22)
CALCIUM SERPL-MCNC: 9.3 MG/DL (ref 8.4–10.2)
CHLORIDE SERPL-SCNC: 102 MMOL/L (ref 96–112)
CO2 SERPL-SCNC: 28 MMOL/L (ref 20–33)
COLOR UR: ABNORMAL
CREAT SERPL-MCNC: 0.91 MG/DL (ref 0.5–1.4)
EOSINOPHIL # BLD AUTO: 0.23 K/UL (ref 0–0.51)
EOSINOPHIL NFR BLD: 1.7 % (ref 0–6.9)
EPI CELLS #/AREA URNS HPF: ABNORMAL /HPF
ERYTHROCYTE [DISTWIDTH] IN BLOOD BY AUTOMATED COUNT: 42.1 FL (ref 35.9–50)
GFR SERPLBLD CREATININE-BSD FMLA CKD-EPI: 87 ML/MIN/1.73 M 2
GLOBULIN SER CALC-MCNC: 3.6 G/DL (ref 1.9–3.5)
GLUCOSE SERPL-MCNC: 87 MG/DL (ref 65–99)
GLUCOSE UR STRIP.AUTO-MCNC: 250 MG/DL
HCG UR QL: NEGATIVE
HCT VFR BLD AUTO: 43.7 % (ref 37–47)
HGB BLD-MCNC: 14.2 G/DL (ref 12–16)
IMM GRANULOCYTES # BLD AUTO: 0.04 K/UL (ref 0–0.11)
IMM GRANULOCYTES NFR BLD AUTO: 0.3 % (ref 0–0.9)
KETONES UR STRIP.AUTO-MCNC: 15 MG/DL
LACTATE SERPL-SCNC: 1 MMOL/L (ref 0.5–2)
LEUKOCYTE ESTERASE UR QL STRIP.AUTO: ABNORMAL
LYMPHOCYTES # BLD AUTO: 3.44 K/UL (ref 1–4.8)
LYMPHOCYTES NFR BLD: 25.6 % (ref 22–41)
MCH RBC QN AUTO: 28 PG (ref 27–33)
MCHC RBC AUTO-ENTMCNC: 32.5 G/DL (ref 33.6–35)
MCV RBC AUTO: 86 FL (ref 81.4–97.8)
MICRO URNS: ABNORMAL
MONOCYTES # BLD AUTO: 1.2 K/UL (ref 0–0.85)
MONOCYTES NFR BLD AUTO: 8.9 % (ref 0–13.4)
MUCOUS THREADS #/AREA URNS HPF: ABNORMAL /HPF
NEUTROPHILS # BLD AUTO: 8.44 K/UL (ref 2–7.15)
NEUTROPHILS NFR BLD: 63 % (ref 44–72)
NITRITE UR QL STRIP.AUTO: POSITIVE
NRBC # BLD AUTO: 0 K/UL
NRBC BLD-RTO: 0 /100 WBC
PH UR STRIP.AUTO: 6.5 [PH] (ref 5–8)
PLATELET # BLD AUTO: 279 K/UL (ref 164–446)
PMV BLD AUTO: 9.3 FL (ref 9–12.9)
POTASSIUM SERPL-SCNC: 3.6 MMOL/L (ref 3.6–5.5)
PROT SERPL-MCNC: 7.9 G/DL (ref 6–8.2)
PROT UR QL STRIP: >=300 MG/DL
RBC # BLD AUTO: 5.08 M/UL (ref 4.2–5.4)
RBC # URNS HPF: ABNORMAL /HPF
RBC UR QL AUTO: ABNORMAL
SODIUM SERPL-SCNC: 140 MMOL/L (ref 135–145)
SP GR UR STRIP.AUTO: 1.02
WBC # BLD AUTO: 13.4 K/UL (ref 4.8–10.8)
WBC #/AREA URNS HPF: ABNORMAL /HPF

## 2022-10-27 PROCEDURE — 99284 EMERGENCY DEPT VISIT MOD MDM: CPT

## 2022-10-27 PROCEDURE — 87077 CULTURE AEROBIC IDENTIFY: CPT

## 2022-10-27 PROCEDURE — 85025 COMPLETE CBC W/AUTO DIFF WBC: CPT

## 2022-10-27 PROCEDURE — 94760 N-INVAS EAR/PLS OXIMETRY 1: CPT

## 2022-10-27 PROCEDURE — 96375 TX/PRO/DX INJ NEW DRUG ADDON: CPT

## 2022-10-27 PROCEDURE — 83605 ASSAY OF LACTIC ACID: CPT

## 2022-10-27 PROCEDURE — 81025 URINE PREGNANCY TEST: CPT

## 2022-10-27 PROCEDURE — 96374 THER/PROPH/DIAG INJ IV PUSH: CPT

## 2022-10-27 PROCEDURE — 87086 URINE CULTURE/COLONY COUNT: CPT

## 2022-10-27 PROCEDURE — 700111 HCHG RX REV CODE 636 W/ 250 OVERRIDE (IP): Performed by: EMERGENCY MEDICINE

## 2022-10-27 PROCEDURE — 81001 URINALYSIS AUTO W/SCOPE: CPT

## 2022-10-27 PROCEDURE — 700105 HCHG RX REV CODE 258: Performed by: EMERGENCY MEDICINE

## 2022-10-27 PROCEDURE — 87186 SC STD MICRODIL/AGAR DIL: CPT

## 2022-10-27 PROCEDURE — 80053 COMPREHEN METABOLIC PANEL: CPT

## 2022-10-27 PROCEDURE — 36415 COLL VENOUS BLD VENIPUNCTURE: CPT

## 2022-10-27 RX ORDER — SODIUM CHLORIDE 9 MG/ML
1000 INJECTION, SOLUTION INTRAVENOUS ONCE
Status: COMPLETED | OUTPATIENT
Start: 2022-10-27 | End: 2022-10-28

## 2022-10-27 RX ORDER — ACETAMINOPHEN 500 MG
1000 TABLET ORAL EVERY 6 HOURS PRN
Qty: 20 TABLET | Refills: 0 | Status: SHIPPED | OUTPATIENT
Start: 2022-10-27 | End: 2022-10-31

## 2022-10-27 RX ORDER — ONDANSETRON 2 MG/ML
4 INJECTION INTRAMUSCULAR; INTRAVENOUS ONCE
Status: COMPLETED | OUTPATIENT
Start: 2022-10-27 | End: 2022-10-27

## 2022-10-27 RX ORDER — KETOROLAC TROMETHAMINE 30 MG/ML
30 INJECTION, SOLUTION INTRAMUSCULAR; INTRAVENOUS ONCE
Status: COMPLETED | OUTPATIENT
Start: 2022-10-27 | End: 2022-10-27

## 2022-10-27 RX ORDER — SULFAMETHOXAZOLE AND TRIMETHOPRIM 800; 160 MG/1; MG/1
1 TABLET ORAL 2 TIMES DAILY
Qty: 10 TABLET | Refills: 0 | Status: SHIPPED | OUTPATIENT
Start: 2022-10-27 | End: 2022-11-01

## 2022-10-27 RX ORDER — IBUPROFEN 800 MG/1
800 TABLET ORAL EVERY 8 HOURS PRN
Qty: 20 TABLET | Refills: 0 | Status: SHIPPED | OUTPATIENT
Start: 2022-10-27 | End: 2022-10-30

## 2022-10-27 RX ORDER — CEFTRIAXONE 2 G/1
2000 INJECTION, POWDER, FOR SOLUTION INTRAMUSCULAR; INTRAVENOUS ONCE
Status: COMPLETED | OUTPATIENT
Start: 2022-10-27 | End: 2022-10-27

## 2022-10-27 RX ADMIN — ONDANSETRON 4 MG: 2 INJECTION INTRAMUSCULAR; INTRAVENOUS at 23:33

## 2022-10-27 RX ADMIN — KETOROLAC TROMETHAMINE 30 MG: 30 INJECTION, SOLUTION INTRAMUSCULAR at 23:30

## 2022-10-27 RX ADMIN — CEFTRIAXONE SODIUM 2000 MG: 2 INJECTION, POWDER, FOR SOLUTION INTRAMUSCULAR; INTRAVENOUS at 23:30

## 2022-10-27 RX ADMIN — SODIUM CHLORIDE 1000 ML: 9 INJECTION, SOLUTION INTRAVENOUS at 23:00

## 2022-10-27 NOTE — LETTER
10/30/2022               Laura Aden Caro  [address] [place] [id number]        Dear Laura (MR#[id number])    This letter is sent in regards to your recent visit to the [facility] Emergency Department on 10/27/2022. During the visit, tests were performed to assist the physician in your medical diagnosis. A review of your tests requires that we notify you of the following:    Your urine culture was POSITIVE for a bacteria called Proteus mirabilis. The antibiotic prescribed for you (sulfamethoxazole-trimethoprim) should be active to treat this bacteria. It is important that you continue taking your antibiotic until it is finished.     Please feel free to contact me at the number below if you have any questions or concerns. Thank you for your cooperation in the matter.    Sincerely,  ED Culture Follow-Up Staff  Mackenzie Hedrick, PharmD    [facility], Emergency Department  [address] [place] [id number] [phone] (ED Culture Line)

## 2022-10-28 NOTE — ED NOTES
Discharge instructions given to patient. All questions and concerns addressed. Patient ambulatory, and stated safe ride home.

## 2022-10-28 NOTE — ED PROVIDER NOTES
ED Provider Note  ED Provider Note    Scribed for Godwin Castañeda by Godwin Castañeda. 10/27/2022  10:42 PM    Primary care provider: Pcp Pt States None  Means of arrival: Private vehicle  History obtained from: Patient  History limited by: None    CHIEF COMPLAINT  Chief Complaint   Patient presents with    Flank Pain     Endorses dysuria with bilat flank pain x approx 4 days. Denies vomiting, fever, or chills. Endorses nausea.     HPI  Laura Aden Caro is a 31 y.o. female who presents to the Emergency Department with signs and symptoms of possible pyelonephritis.  Patient has an interesting history of yearly pyelonephritis around this time every year.  She states that 4 days ago she developed increased urinary frequency, followed the next day by dysuria, and today she developed lower back pain.  She was told that the signs and symptoms given be consistent with pyelonephritis that she should come to emergency room if this happened again.  She has been taking Azo to help with symptoms.  Denies any fevers.  She endorses nausea but no vomiting.  No diarrhea.  She is otherwise fairly healthy.  She does not think there is any chance she is pregnant.  She has not been seen by a physician yet.  Quality: Dysuria, burning with urination  Duration: 3 to 4 days  Severity: Moderate  Associated sx: Lower back pain, nausea    REVIEW OF SYSTEMS  As above, all other systems reviewed and are negative.   See HPI for further details.     PAST MEDICAL HISTORY   has a past medical history of Patient denies medical problems.  SURGICAL HISTORY   has a past surgical history that includes other orthopedic surgery (1996) and primary c section (5/28/2017).  SOCIAL HISTORY  Social History     Tobacco Use    Smoking status: Never   Substance Use Topics    Alcohol use: Yes    Drug use: No      Social History     Substance and Sexual Activity   Drug Use No     FAMILY HISTORY  Family History   Problem Relation Age of Onset     "Thyroid Mother     Diabetes Father     Asthma Brother      CURRENT MEDICATIONS  Home Medications       Reviewed by Diana Rosas R.N. (Registered Nurse) on 10/27/22 at 2231  Med List Status: Not Addressed     Medication Last Dose Status   cefdinir (OMNICEF) 300 MG Cap  Active   cephALEXin (KEFLEX) 500 MG Cap  Active   docusate sodium 100 MG Cap  Active   FOLIC ACID PO  Active   ketorolac (TORADOL) 10 MG Tab  Active   ondansetron (ZOFRAN ODT) 4 MG TABLET DISPERSIBLE  Active   oxycodone-acetaminophen (PERCOCET) 5-325 MG Tab  Active   Prenatal MV-Min-Fe Fum-FA-DHA (PRENATAL 1 PO)  Active                  ALLERGIES  No Known Allergies    PHYSICAL EXAM    VITAL SIGNS:   Vitals:    10/27/22 2228 10/27/22 2230   BP:  134/83   Pulse: 87    Resp: 18    Temp: 36.3 °C (97.3 °F)    TempSrc: Temporal    SpO2: 95%    Weight: (!) 129 kg (284 lb 9.8 oz)    Height: 1.778 m (5' 10\")      Vitals: My interpretation: normotensive, not tachycardic, afebrile, not hypoxic    Reinterpretation of vitals: Unchanged    PE:   Constitutional: Well developed, Well nourished, No acute distress, Non-toxic appearance.   HENT: Normocephalic, Atraumatic, Bilateral external ears normal, Oropharynx is clear mucous membranes are moist. No oral exudates or nasal discharge.   Eyes: Pupils are equal round and reactive, EOMI, Conjunctiva normal, No discharge.   Neck: Normal range of motion, No tenderness, Supple, No stridor. No meningismus.  Lymphatic: No lymphadenopathy noted.   Cardiovascular: Regular rate and rhythm without murmur rub or gallop.  Thorax & Lungs: Clear breath sounds bilaterally without wheezes, rhonchi or rales. There is no chest wall tenderness.   Abdomen: Soft non-tender non-distended. There is no rebound or guarding. No organomegaly is appreciated. Bowel sounds are normal.  Skin: Normal without rash.   Back: Positive CVA tenderness or spinal tenderness.   Extremities: Intact distal pulses, No edema, No tenderness, No cyanosis, No " clubbing. Capillary refill is less than 2 seconds.  Musculoskeletal: Good range of motion in all major joints. No tenderness to palpation or major deformities noted.   Neurologic: Alert & oriented x 3, Normal motor function, Normal sensory function, No focal deficits noted. Reflexes are normal.  Psychiatric: Affect normal, Judgment normal, Mood normal. There is no suicidal ideation or patient reported hallucinations.     DIAGNOSTIC STUDIES / PROCEDURES    LABS  Results for orders placed or performed during the hospital encounter of 10/27/22   URINALYSIS CULTURE, IF INDICATED    Specimen: Urine   Result Value Ref Range    Color Red (A)     Character Hazy (A)     Specific Gravity 1.025 <1.035    Ph 6.5 5.0 - 8.0    Glucose 250 (A) Negative mg/dL    Ketones 15 (A) Negative mg/dL    Protein >=300 (A) Negative mg/dL    Bilirubin Negative Negative    Nitrite Positive (A) Negative    Leukocyte Esterase Moderate (A) Negative    Occult Blood Trace (A) Negative    Micro Urine Req Microscopic    HCG QUALITATIVE UR (Lab)   Result Value Ref Range    Beta-Hcg Urine Negative Negative   CBC WITH DIFFERENTIAL   Result Value Ref Range    WBC 13.4 (H) 4.8 - 10.8 K/uL    RBC 5.08 4.20 - 5.40 M/uL    Hemoglobin 14.2 12.0 - 16.0 g/dL    Hematocrit 43.7 37.0 - 47.0 %    MCV 86.0 81.4 - 97.8 fL    MCH 28.0 27.0 - 33.0 pg    MCHC 32.5 (L) 33.6 - 35.0 g/dL    RDW 42.1 35.9 - 50.0 fL    Platelet Count 279 164 - 446 K/uL    MPV 9.3 9.0 - 12.9 fL    Neutrophils-Polys 63.00 44.00 - 72.00 %    Lymphocytes 25.60 22.00 - 41.00 %    Monocytes 8.90 0.00 - 13.40 %    Eosinophils 1.70 0.00 - 6.90 %    Basophils 0.50 0.00 - 1.80 %    Immature Granulocytes 0.30 0.00 - 0.90 %    Nucleated RBC 0.00 /100 WBC    Neutrophils (Absolute) 8.44 (H) 2.00 - 7.15 K/uL    Lymphs (Absolute) 3.44 1.00 - 4.80 K/uL    Monos (Absolute) 1.20 (H) 0.00 - 0.85 K/uL    Eos (Absolute) 0.23 0.00 - 0.51 K/uL    Baso (Absolute) 0.07 0.00 - 0.12 K/uL    Immature Granulocytes (abs)  0.04 0.00 - 0.11 K/uL    NRBC (Absolute) 0.00 K/uL   COMP METABOLIC PANEL   Result Value Ref Range    Sodium 140 135 - 145 mmol/L    Potassium 3.6 3.6 - 5.5 mmol/L    Chloride 102 96 - 112 mmol/L    Co2 28 20 - 33 mmol/L    Anion Gap 10.0 7.0 - 16.0    Glucose 87 65 - 99 mg/dL    Bun 17 8 - 22 mg/dL    Creatinine 0.91 0.50 - 1.40 mg/dL    Calcium 9.3 8.4 - 10.2 mg/dL    AST(SGOT) 24 12 - 45 U/L    ALT(SGPT) 37 2 - 50 U/L    Alkaline Phosphatase 128 (H) 30 - 99 U/L    Total Bilirubin 0.5 0.1 - 1.5 mg/dL    Albumin 4.3 3.2 - 4.9 g/dL    Total Protein 7.9 6.0 - 8.2 g/dL    Globulin 3.6 (H) 1.9 - 3.5 g/dL    A-G Ratio 1.2 g/dL   LACTIC ACID   Result Value Ref Range    Lactic Acid 1.0 0.5 - 2.0 mmol/L   URINE MICROSCOPIC (W/UA)   Result Value Ref Range    WBC 20-50 (A) /hpf    RBC 2-5 (A) /hpf    Bacteria Few (A) None /hpf    Epithelial Cells Rare Few /hpf    Mucous Threads Moderate /hpf   URINE CULTURE(NEW)    Specimen: Urine   Result Value Ref Range    Significant Indicator NEG     Source UR     Site -     Culture Result -    ESTIMATED GFR   Result Value Ref Range    GFR (CKD-EPI) 87 >60 mL/min/1.73 m 2      All labs reviewed by me. Significant for urinalysis demonstrates infection, WBC count is 13,000, no anemia, normal electrolytes, normal renal function, normal liver enzymes, normal bilirubin, lactic acid normal, pregnancy test negative    RADIOLOGY  No orders to display     The radiologist's interpretation of all radiological studies have been reviewed by me.    COURSE & MEDICAL DECISION MAKING  Nursing notes, VS, PMSFHx, labs, imaging, EKG reviewed in chart.    MDM: 10:42 PM Laura Aden Caro is a 31 y.o. female who presented with signs and symptoms concerning for pyelonephritis which is recurrent for her, including increased urinary frequency, dysuria, CVA tenderness and back pain.  Denies fever.  She is fairly well-appearing upon arrival, with normal vital signs and is afebrile.  She does have  positive CVA tenderness on exam.  Labs, fluids and urinalysis ordered for evaluation.  Labs are significant for leukocytosis of 13 and urinalysis demonstrating UTI in the clinical setting of pyelonephritis.  Patient has done well in the past on Bactrim.  She was treated with dose of ceftriaxone, IV fluids and Toradol here in the ED.  Rx for Tylenol, Motrin, Bactrim and strict return precautions which were discussed with her and her partner at bedside.  She is otherwise well-appearing, normal vital signs.  Benign repeat physical exam.    HYDRATION: Based on the patient's presentation of Acute Vomiting, Dehydration and Inability to take oral fluids the patient was given IV fluids. IV Hydration was used because oral hydration was not adequate alone. Upon recheck following hydration, the patient was imroved.    FINAL IMPRESSION  1. Pyelonephritis Acute   2. Acute UTI Acute   3. Leukocytosis, unspecified type Acute      The note accurately reflects work and decisions made by me.  Godwin Castañeda  10/27/2022  10:42 PM

## 2022-10-28 NOTE — DISCHARGE INSTRUCTIONS
You treated with pain medication and antibiotics tonight.  I sent the same to the pharmacy for pickup in the morning to help with symptoms.  If you develop a fever or worsening symptoms, he should follow-up with her PCP or return to the emergency department for further evaluation treatment.  Thank you for coming in today.

## 2022-10-30 NOTE — ED NOTES
"ED Positive Culture Follow-up/Notification Note:    Date: 10/30/2022     Patient seen in the ED on 10/27/2022 for UTI.   1. Pyelonephritis Acute   2. Acute UTI Acute   3. Leukocytosis, unspecified type Acute      Discharge Medication List as of 10/27/2022 11:40 PM        START taking these medications    Details   acetaminophen (TYLENOL) 500 MG Tab Take 2 Tablets by mouth every 6 hours as needed for Moderate Pain for up to 4 days., Disp-20 Tablet, R-0, Normal      ibuprofen (MOTRIN) 800 MG Tab Take 1 Tablet by mouth every 8 hours as needed for Mild Pain for up to 3 days., Disp-20 Tablet, R-0, Normal      sulfamethoxazole-trimethoprim (BACTRIM DS) 800-160 MG tablet Take 1 Tablet by mouth 2 times a day for 5 days., Disp-10 Tablet, R-0, Normal             Allergies: Patient has no known allergies.     Vitals:    10/27/22 2228 10/27/22 2230 10/27/22 2255 10/27/22 2337   BP:  134/83 127/71    Pulse: 87  73 78   Resp: 18  18 19   Temp: 36.3 °C (97.3 °F)  36.1 °C (97 °F)    TempSrc: Temporal  Temporal    SpO2: 95%  97% 95%   Weight: (!) 129 kg (284 lb 9.8 oz)      Height: 1.778 m (5' 10\")          Final cultures:   Results       Procedure Component Value Units Date/Time    URINE CULTURE(NEW) [[id number]]  (Abnormal)  (Susceptibility) Collected: 10/27/22 2239    Order Status: Completed Specimen: Urine Updated: 10/30/22 1221     Significant Indicator POS     Source UR     Site -     Culture Result -      Proteus mirabilis  ,000 cfu/mL      Narrative:      Indication for culture:->Patient WITHOUT an indwelling [place]  catheter in place with new onset of Dysuria, Frequency,  Urgency, and/or Suprapubic pain    Susceptibility       Proteus mirabilis (1)       Antibiotic Interpretation Microscan   Method Status    Amikacin  [*]  Sensitive <=16 mcg/mL MARCE Final    Ampicillin Sensitive <=8 mcg/mL MARCE Final    Amoxicillin/CA  [*]  Sensitive <=8/4 mcg/mL MARCE Final    Aztreonam  [*]  Sensitive <=4 mcg/mL MARCE Final    " Ceftolozane+Tazobactam  [*]  Sensitive <=2 mcg/mL MARCE Final    Ceftriaxone Sensitive <=1 mcg/mL MARCE Final    Ceftazidime  [*]  Sensitive <=1 mcg/mL MARCE Final    Cefazolin Sensitive <=2 mcg/mL MARCE Final     Breakpoints when Cefazolin is used for therapy of infections  other than uncomplicated UTIs due to Enterobacterales are as  follows:  MARCE and Interpretation:  <=2 S  4 I  >=8 R         Ciprofloxacin Sensitive <=0.25 mcg/mL MARCE Final    Cefepime Sensitive <=2 mcg/mL MARCE Final    Ampicillin/sulbactam Sensitive <=4/2 mcg/mL MARCE Final    Cefuroxime Sensitive <=4 mcg/mL MARCE Final    Tobramycin Sensitive <=2 mcg/mL MARCE Final    Ertapenem  [*]  Sensitive <=0.5 mcg/mL MARCE Final    Nitrofurantoin Resistant >64 mcg/mL MARCE Final    Gentamicin Sensitive <=2 mcg/mL MARCE Final    Levofloxacin Sensitive <=0.5 mcg/mL MARCE Final    Meropenem  [*]  Sensitive <=1 mcg/mL MARCE Final    Meropenem/Vaborbactam  [*]  Sensitive <=2 mcg/mL MARCE Final    Minocycline Resistant >8 mcg/mL MARCE Final    Pip/Tazobactam Sensitive <=8 mcg/mL MARCE Final    Trimeth/Sulfa Sensitive <=0.5/9.5 mcg/mL MARCE Final    Tetracycline  [*]  Resistant >8 mcg/mL MARCE Final               [*]  Suppressed Antibiotic                   URINALYSIS CULTURE, IF INDICATED [[id number]]  (Abnormal) Collected: 10/27/22 2239    Order Status: Completed Specimen: Urine Updated: 10/27/22 2259     Color Red     Character Hazy     Specific Gravity 1.025     Ph 6.5     Glucose 250 mg/dL      Ketones 15 mg/dL      Protein >=300 mg/dL      Bilirubin Negative     Nitrite Positive     Leukocyte Esterase Moderate     Occult Blood Trace     Micro Urine Req Microscopic    Narrative:      Indication for culture:->Patient WITHOUT an indwelling [place]  catheter in place with new onset of Dysuria, Frequency,  Urgency, and/or Suprapubic pain            Plan:   Appropriate antibiotic therapy prescribed. No changes required based upon culture result.  Sent letter to patient to notify of positive culture  result and encourage compliance with prescribed antibiotics.     Mackenzie Hedrick, PharmD

## 2023-09-14 ENCOUNTER — HOSPITAL ENCOUNTER (OUTPATIENT)
Dept: RADIOLOGY | Facility: MEDICAL CENTER | Age: 33
End: 2023-09-14
Attending: PHYSICIAN ASSISTANT

## 2023-09-14 DIAGNOSIS — N91.2 AMENORRHEA: ICD-10-CM

## 2023-10-14 ENCOUNTER — HOSPITAL ENCOUNTER (INPATIENT)
Facility: MEDICAL CENTER | Age: 33
LOS: 1 days | DRG: 158 | End: 2023-10-15
Attending: EMERGENCY MEDICINE | Admitting: HOSPITALIST

## 2023-10-14 ENCOUNTER — APPOINTMENT (OUTPATIENT)
Dept: RADIOLOGY | Facility: MEDICAL CENTER | Age: 33
DRG: 158 | End: 2023-10-14
Attending: EMERGENCY MEDICINE

## 2023-10-14 ENCOUNTER — APPOINTMENT (OUTPATIENT)
Dept: RADIOLOGY | Facility: MEDICAL CENTER | Age: 33
DRG: 158 | End: 2023-10-14
Attending: DENTIST

## 2023-10-14 DIAGNOSIS — L03.211 FACIAL CELLULITIS: ICD-10-CM

## 2023-10-14 DIAGNOSIS — K04.7 DENTAL ABSCESS: ICD-10-CM

## 2023-10-14 PROBLEM — F41.9 ANXIETY: Status: ACTIVE | Noted: 2023-10-14

## 2023-10-14 PROBLEM — E86.0 DEHYDRATION: Status: ACTIVE | Noted: 2023-10-14

## 2023-10-14 PROBLEM — R73.03 PREDIABETES: Status: ACTIVE | Noted: 2023-10-14

## 2023-10-14 LAB
ALBUMIN SERPL BCP-MCNC: 4 G/DL (ref 3.2–4.9)
ALBUMIN/GLOB SERPL: 1.1 G/DL
ALP SERPL-CCNC: 121 U/L (ref 30–99)
ALT SERPL-CCNC: 23 U/L (ref 2–50)
ANION GAP SERPL CALC-SCNC: 10 MMOL/L (ref 7–16)
AST SERPL-CCNC: 13 U/L (ref 12–45)
BASOPHILS # BLD AUTO: 0.5 % (ref 0–1.8)
BASOPHILS # BLD: 0.05 K/UL (ref 0–0.12)
BILIRUB SERPL-MCNC: 0.6 MG/DL (ref 0.1–1.5)
BUN SERPL-MCNC: 14 MG/DL (ref 8–22)
CALCIUM ALBUM COR SERPL-MCNC: 9 MG/DL (ref 8.5–10.5)
CALCIUM SERPL-MCNC: 9 MG/DL (ref 8.4–10.2)
CHLORIDE SERPL-SCNC: 103 MMOL/L (ref 96–112)
CO2 SERPL-SCNC: 25 MMOL/L (ref 20–33)
CREAT SERPL-MCNC: 0.66 MG/DL (ref 0.5–1.4)
EOSINOPHIL # BLD AUTO: 0.15 K/UL (ref 0–0.51)
EOSINOPHIL NFR BLD: 1.4 % (ref 0–6.9)
ERYTHROCYTE [DISTWIDTH] IN BLOOD BY AUTOMATED COUNT: 40.6 FL (ref 35.9–50)
GFR SERPLBLD CREATININE-BSD FMLA CKD-EPI: 120 ML/MIN/1.73 M 2
GLOBULIN SER CALC-MCNC: 3.6 G/DL (ref 1.9–3.5)
GLUCOSE BLD STRIP.AUTO-MCNC: 103 MG/DL (ref 65–99)
GLUCOSE BLD STRIP.AUTO-MCNC: 133 MG/DL (ref 65–99)
GLUCOSE SERPL-MCNC: 99 MG/DL (ref 65–99)
HCG SERPL QL: NEGATIVE
HCT VFR BLD AUTO: 42.1 % (ref 37–47)
HGB BLD-MCNC: 13.6 G/DL (ref 12–16)
IMM GRANULOCYTES # BLD AUTO: 0.05 K/UL (ref 0–0.11)
IMM GRANULOCYTES NFR BLD AUTO: 0.5 % (ref 0–0.9)
LYMPHOCYTES # BLD AUTO: 1.96 K/UL (ref 1–4.8)
LYMPHOCYTES NFR BLD: 18.8 % (ref 22–41)
MCH RBC QN AUTO: 27.7 PG (ref 27–33)
MCHC RBC AUTO-ENTMCNC: 32.3 G/DL (ref 32.2–35.5)
MCV RBC AUTO: 85.7 FL (ref 81.4–97.8)
MONOCYTES # BLD AUTO: 0.74 K/UL (ref 0–0.85)
MONOCYTES NFR BLD AUTO: 7.1 % (ref 0–13.4)
NEUTROPHILS # BLD AUTO: 7.46 K/UL (ref 1.82–7.42)
NEUTROPHILS NFR BLD: 71.7 % (ref 44–72)
NRBC # BLD AUTO: 0 K/UL
NRBC BLD-RTO: 0 /100 WBC (ref 0–0.2)
PLATELET # BLD AUTO: 243 K/UL (ref 164–446)
PMV BLD AUTO: 9.6 FL (ref 9–12.9)
POTASSIUM SERPL-SCNC: 3.9 MMOL/L (ref 3.6–5.5)
PROT SERPL-MCNC: 7.6 G/DL (ref 6–8.2)
RBC # BLD AUTO: 4.91 M/UL (ref 4.2–5.4)
SODIUM SERPL-SCNC: 138 MMOL/L (ref 135–145)
WBC # BLD AUTO: 10.4 K/UL (ref 4.8–10.8)

## 2023-10-14 PROCEDURE — 700105 HCHG RX REV CODE 258: Performed by: HOSPITALIST

## 2023-10-14 PROCEDURE — 96365 THER/PROPH/DIAG IV INF INIT: CPT

## 2023-10-14 PROCEDURE — 99285 EMERGENCY DEPT VISIT HI MDM: CPT

## 2023-10-14 PROCEDURE — 85025 COMPLETE CBC W/AUTO DIFF WBC: CPT

## 2023-10-14 PROCEDURE — 700117 HCHG RX CONTRAST REV CODE 255: Performed by: EMERGENCY MEDICINE

## 2023-10-14 PROCEDURE — 84703 CHORIONIC GONADOTROPIN ASSAY: CPT

## 2023-10-14 PROCEDURE — 82962 GLUCOSE BLOOD TEST: CPT | Mod: 91

## 2023-10-14 PROCEDURE — 700111 HCHG RX REV CODE 636 W/ 250 OVERRIDE (IP): Mod: JZ | Performed by: EMERGENCY MEDICINE

## 2023-10-14 PROCEDURE — 700102 HCHG RX REV CODE 250 W/ 637 OVERRIDE(OP): Performed by: HOSPITALIST

## 2023-10-14 PROCEDURE — A9270 NON-COVERED ITEM OR SERVICE: HCPCS | Performed by: HOSPITALIST

## 2023-10-14 PROCEDURE — 700105 HCHG RX REV CODE 258: Performed by: EMERGENCY MEDICINE

## 2023-10-14 PROCEDURE — 70491 CT SOFT TISSUE NECK W/DYE: CPT

## 2023-10-14 PROCEDURE — 99223 1ST HOSP IP/OBS HIGH 75: CPT | Performed by: HOSPITALIST

## 2023-10-14 PROCEDURE — 96375 TX/PRO/DX INJ NEW DRUG ADDON: CPT

## 2023-10-14 PROCEDURE — 36415 COLL VENOUS BLD VENIPUNCTURE: CPT

## 2023-10-14 PROCEDURE — 70355 PANORAMIC X-RAY OF JAWS: CPT

## 2023-10-14 PROCEDURE — 770001 HCHG ROOM/CARE - MED/SURG/GYN PRIV*

## 2023-10-14 PROCEDURE — 80053 COMPREHEN METABOLIC PANEL: CPT

## 2023-10-14 PROCEDURE — 94760 N-INVAS EAR/PLS OXIMETRY 1: CPT

## 2023-10-14 PROCEDURE — 700111 HCHG RX REV CODE 636 W/ 250 OVERRIDE (IP): Mod: JZ | Performed by: HOSPITALIST

## 2023-10-14 RX ORDER — PROMETHAZINE HYDROCHLORIDE 25 MG/1
12.5-25 SUPPOSITORY RECTAL EVERY 4 HOURS PRN
Status: DISCONTINUED | OUTPATIENT
Start: 2023-10-14 | End: 2023-10-15 | Stop reason: HOSPADM

## 2023-10-14 RX ORDER — OXYCODONE HYDROCHLORIDE 5 MG/1
5 TABLET ORAL
Status: DISCONTINUED | OUTPATIENT
Start: 2023-10-14 | End: 2023-10-15 | Stop reason: HOSPADM

## 2023-10-14 RX ORDER — OXYCODONE HYDROCHLORIDE 10 MG/1
10 TABLET ORAL
Status: DISCONTINUED | OUTPATIENT
Start: 2023-10-14 | End: 2023-10-15 | Stop reason: HOSPADM

## 2023-10-14 RX ORDER — ONDANSETRON 2 MG/ML
4 INJECTION INTRAMUSCULAR; INTRAVENOUS ONCE
Status: COMPLETED | OUTPATIENT
Start: 2023-10-14 | End: 2023-10-14

## 2023-10-14 RX ORDER — POLYETHYLENE GLYCOL 3350 17 G/17G
1 POWDER, FOR SOLUTION ORAL
Status: DISCONTINUED | OUTPATIENT
Start: 2023-10-14 | End: 2023-10-15 | Stop reason: HOSPADM

## 2023-10-14 RX ORDER — NAPROXEN SODIUM 220 MG
220-660 TABLET ORAL EVERY 6 HOURS PRN
Status: ON HOLD | COMMUNITY
End: 2023-10-15 | Stop reason: SDUPTHER

## 2023-10-14 RX ORDER — ESCITALOPRAM OXALATE 10 MG/1
10 TABLET ORAL EVERY EVENING
Status: DISCONTINUED | OUTPATIENT
Start: 2023-10-14 | End: 2023-10-15 | Stop reason: HOSPADM

## 2023-10-14 RX ORDER — DEXTROSE MONOHYDRATE 25 G/50ML
25 INJECTION, SOLUTION INTRAVENOUS
Status: DISCONTINUED | OUTPATIENT
Start: 2023-10-14 | End: 2023-10-15 | Stop reason: HOSPADM

## 2023-10-14 RX ORDER — MORPHINE SULFATE 4 MG/ML
4 INJECTION INTRAVENOUS ONCE
Status: COMPLETED | OUTPATIENT
Start: 2023-10-14 | End: 2023-10-14

## 2023-10-14 RX ORDER — ONDANSETRON 4 MG/1
4 TABLET, ORALLY DISINTEGRATING ORAL EVERY 4 HOURS PRN
Status: DISCONTINUED | OUTPATIENT
Start: 2023-10-14 | End: 2023-10-15 | Stop reason: HOSPADM

## 2023-10-14 RX ORDER — HYDROMORPHONE HYDROCHLORIDE 1 MG/ML
0.5 INJECTION, SOLUTION INTRAMUSCULAR; INTRAVENOUS; SUBCUTANEOUS
Status: DISCONTINUED | OUTPATIENT
Start: 2023-10-14 | End: 2023-10-15 | Stop reason: HOSPADM

## 2023-10-14 RX ORDER — SODIUM CHLORIDE, SODIUM LACTATE, POTASSIUM CHLORIDE, CALCIUM CHLORIDE 600; 310; 30; 20 MG/100ML; MG/100ML; MG/100ML; MG/100ML
1000 INJECTION, SOLUTION INTRAVENOUS CONTINUOUS
Status: ACTIVE | OUTPATIENT
Start: 2023-10-14 | End: 2023-10-14

## 2023-10-14 RX ORDER — ACETAMINOPHEN 325 MG/1
650 TABLET ORAL EVERY 6 HOURS PRN
Status: DISCONTINUED | OUTPATIENT
Start: 2023-10-14 | End: 2023-10-15 | Stop reason: HOSPADM

## 2023-10-14 RX ORDER — ESCITALOPRAM OXALATE 10 MG/1
10 TABLET ORAL EVERY EVENING
COMMUNITY

## 2023-10-14 RX ORDER — ONDANSETRON 2 MG/ML
4 INJECTION INTRAMUSCULAR; INTRAVENOUS EVERY 4 HOURS PRN
Status: DISCONTINUED | OUTPATIENT
Start: 2023-10-14 | End: 2023-10-15 | Stop reason: HOSPADM

## 2023-10-14 RX ORDER — AMOXICILLIN 250 MG
2 CAPSULE ORAL 2 TIMES DAILY
Status: DISCONTINUED | OUTPATIENT
Start: 2023-10-14 | End: 2023-10-15 | Stop reason: HOSPADM

## 2023-10-14 RX ORDER — PROMETHAZINE HYDROCHLORIDE 25 MG/1
12.5-25 TABLET ORAL EVERY 4 HOURS PRN
Status: DISCONTINUED | OUTPATIENT
Start: 2023-10-14 | End: 2023-10-15 | Stop reason: HOSPADM

## 2023-10-14 RX ORDER — SODIUM CHLORIDE, SODIUM LACTATE, POTASSIUM CHLORIDE, CALCIUM CHLORIDE 600; 310; 30; 20 MG/100ML; MG/100ML; MG/100ML; MG/100ML
INJECTION, SOLUTION INTRAVENOUS CONTINUOUS
Status: DISCONTINUED | OUTPATIENT
Start: 2023-10-15 | End: 2023-10-15 | Stop reason: HOSPADM

## 2023-10-14 RX ORDER — PROCHLORPERAZINE EDISYLATE 5 MG/ML
5-10 INJECTION INTRAMUSCULAR; INTRAVENOUS EVERY 4 HOURS PRN
Status: DISCONTINUED | OUTPATIENT
Start: 2023-10-14 | End: 2023-10-15 | Stop reason: HOSPADM

## 2023-10-14 RX ORDER — BISACODYL 10 MG
10 SUPPOSITORY, RECTAL RECTAL
Status: DISCONTINUED | OUTPATIENT
Start: 2023-10-14 | End: 2023-10-15 | Stop reason: HOSPADM

## 2023-10-14 RX ORDER — METFORMIN HYDROCHLORIDE 500 MG/1
500 TABLET, EXTENDED RELEASE ORAL EVERY EVENING
COMMUNITY

## 2023-10-14 RX ORDER — ACETAMINOPHEN 325 MG/1
650 TABLET ORAL EVERY 6 HOURS PRN
Status: DISCONTINUED | OUTPATIENT
Start: 2023-10-14 | End: 2023-10-14

## 2023-10-14 RX ADMIN — ACETAMINOPHEN 650 MG: 325 TABLET ORAL at 20:58

## 2023-10-14 RX ADMIN — ONDANSETRON 4 MG: 2 INJECTION INTRAMUSCULAR; INTRAVENOUS at 11:22

## 2023-10-14 RX ADMIN — IOHEXOL 80 ML: 350 INJECTION, SOLUTION INTRAVENOUS at 10:28

## 2023-10-14 RX ADMIN — AMPICILLIN AND SULBACTAM 3 G: 1; 2 INJECTION, POWDER, FOR SOLUTION INTRAMUSCULAR; INTRAVENOUS at 10:46

## 2023-10-14 RX ADMIN — MORPHINE SULFATE 4 MG: 4 INJECTION INTRAVENOUS at 11:22

## 2023-10-14 RX ADMIN — ESCITALOPRAM OXALATE 10 MG: 10 TABLET ORAL at 17:32

## 2023-10-14 RX ADMIN — AMPICILLIN AND SULBACTAM 3 G: 1; 2 INJECTION, POWDER, FOR SOLUTION INTRAMUSCULAR; INTRAVENOUS at 23:48

## 2023-10-14 RX ADMIN — SODIUM CHLORIDE, POTASSIUM CHLORIDE, SODIUM LACTATE AND CALCIUM CHLORIDE 1000 ML: 600; 310; 30; 20 INJECTION, SOLUTION INTRAVENOUS at 14:38

## 2023-10-14 RX ADMIN — ACETAMINOPHEN 650 MG: 325 TABLET ORAL at 14:35

## 2023-10-14 RX ADMIN — AMPICILLIN AND SULBACTAM 3 G: 1; 2 INJECTION, POWDER, FOR SOLUTION INTRAMUSCULAR; INTRAVENOUS at 17:37

## 2023-10-14 ASSESSMENT — PATIENT HEALTH QUESTIONNAIRE - PHQ9
1. LITTLE INTEREST OR PLEASURE IN DOING THINGS: NOT AT ALL
SUM OF ALL RESPONSES TO PHQ9 QUESTIONS 1 AND 2: 0
2. FEELING DOWN, DEPRESSED, IRRITABLE, OR HOPELESS: NOT AT ALL

## 2023-10-14 ASSESSMENT — FIBROSIS 4 INDEX
FIB4 SCORE: 0.35
FIB4 SCORE: 0.44

## 2023-10-14 ASSESSMENT — LIFESTYLE VARIABLES
EVER FELT BAD OR GUILTY ABOUT YOUR DRINKING: NO
HAVE YOU EVER FELT YOU SHOULD CUT DOWN ON YOUR DRINKING: NO
TOTAL SCORE: 0
ALCOHOL_USE: NO
AVERAGE NUMBER OF DAYS PER WEEK YOU HAVE A DRINK CONTAINING ALCOHOL: 0
TOTAL SCORE: 0
HAVE PEOPLE ANNOYED YOU BY CRITICIZING YOUR DRINKING: NO
EVER HAD A DRINK FIRST THING IN THE MORNING TO STEADY YOUR NERVES TO GET RID OF A HANGOVER: NO
TOTAL SCORE: 0
ON A TYPICAL DAY WHEN YOU DRINK ALCOHOL HOW MANY DRINKS DO YOU HAVE: 0
CONSUMPTION TOTAL: NEGATIVE
HOW MANY TIMES IN THE PAST YEAR HAVE YOU HAD 5 OR MORE DRINKS IN A DAY: 0

## 2023-10-14 ASSESSMENT — ENCOUNTER SYMPTOMS
NERVOUS/ANXIOUS: 0
EYE REDNESS: 0
BRUISES/BLEEDS EASILY: 0
FOCAL WEAKNESS: 0
STRIDOR: 0
SHORTNESS OF BREATH: 0
ABDOMINAL PAIN: 0
EYE DISCHARGE: 0
FEVER: 0
COUGH: 0
CHILLS: 0
MYALGIAS: 0
FLANK PAIN: 0
VOMITING: 0

## 2023-10-14 ASSESSMENT — PAIN DESCRIPTION - PAIN TYPE
TYPE: ACUTE PAIN

## 2023-10-14 NOTE — ED TRIAGE NOTES
"Chief Complaint   Patient presents with    Facial Swelling     Right lower jaw/right neck swelling started yesterday. Pt c/o abscessed tooth (right lower second to last molar)- pt reports pain and purulent drainage at gumline.      BP (!) 152/94   Pulse 72   Temp 36.7 °C (98 °F) (Temporal)   Resp 14   Ht 1.778 m (5' 10\")   Wt 124 kg (272 lb 14.9 oz)   SpO2 95%   BMI 39.16 kg/m²     Pt states had amoxicillin at home (not prescribed for this issue) and has taken 3 doses starting yesterday morning.   "

## 2023-10-14 NOTE — ED NOTES
Pharmacy Medication Reconciliation    ~Med rec updated and complete per patient at bedside   ~Allergies have been verified  ~Pt home pharmacy: [facility]      ~Patient reports that she took Amoxil twice yesterday and once today.       ~Anticoagulants (rivaroxaban, apixaban, edoxaban, dabigatran, warfarin, enoxaparin) taken in the last 14 days? No

## 2023-10-14 NOTE — ED NOTES
Antibiotic infusion started as ordered. Pt c/o 8/10 right lower jaw dental pain and is requesting pain medication, ERP updated on pt request.

## 2023-10-14 NOTE — PROGRESS NOTES
4 Eyes Skin Assessment Completed by GAMAL Blanco and GAMAL Seymour.    Head WDL  Ears WDL  Nose WDL  Mouth WDL  Neck WDL  Breast/Chest WDL  Shoulder Blades WDL  Spine WDL  (R) Arm/Elbow/Hand WDL  (L) Arm/Elbow/Hand WDL  Abdomen WDL  Groin WDL  Scrotum/Coccyx/Buttocks WDL  (R) Leg WDL  (L) Leg WDL  (R) Heel/Foot/Toe WDL  (L) Heel/Foot/Toe WDL          Devices In Places Blood Pressure Cuff and Pulse Ox      Interventions In Place Pillows    Possible Skin Injury No    Pictures Uploaded Into Epic N/A  Wound Consult Placed N/A  RN Wound Prevention Protocol Ordered No

## 2023-10-14 NOTE — H&P
Hospital Medicine History & Physical Note    Date of Service  10/14/2023    Primary Care Physician  Pcp Pt States None    Consultants  Dr Singer      Code Status  Full Code    Chief Complaint  Chief Complaint   Patient presents with    Facial Swelling     Right lower jaw/right neck swelling started yesterday. Pt c/o abscessed tooth (right lower second to last molar)- pt reports pain and purulent drainage at gumline.      History of Presenting Illness  Laura Aden Caro is a 32 y.o. female with no significant past medical history other than elevated BMI, anxiety and prediabetes who presented 10/14/2023 with progressively worsening facial pain and swelling on the right side.  She denies having fevers or chills.    I discussed the plan of care with emergency department physician, the patient    Review of Systems  Review of Systems   Constitutional:  Positive for malaise/fatigue. Negative for chills and fever.   HENT:          Right-sided facial swelling and pain   Eyes:  Negative for discharge and redness.   Respiratory:  Negative for cough, shortness of breath and stridor.    Cardiovascular:  Negative for chest pain and leg swelling.   Gastrointestinal:  Negative for abdominal pain and vomiting.   Genitourinary:  Negative for flank pain.        Pain and difficulty with swallowing   Musculoskeletal:  Negative for myalgias.   Skin: Negative.    Neurological:  Negative for focal weakness.   Endo/Heme/Allergies:  Does not bruise/bleed easily.   Psychiatric/Behavioral:  The patient is not nervous/anxious.      Past Medical History   has a past medical history of Patient denies medical problems.    Surgical History   has a past surgical history that includes other orthopedic surgery (1996) and primary c section (5/28/2017).     Family History  family history includes Asthma in her brother; Diabetes in her father; Thyroid in her mother.      Social History   reports that she has never smoked. She does not have any  smokeless tobacco history on file. She reports current alcohol use. She reports that she does not use drugs.    Allergies  No Known Allergies    Medications  Prior to Admission Medications   Prescriptions Last Dose Informant Patient Reported? Taking?   Amoxicillin (AMOXIL PO) 10/14/2023 at AM Patient Yes Yes   Sig: Take 1 Tablet by mouth 2 times a day.   Vitamin D-Vitamin K (VITAMIN K2-VITAMIN D3 PO) 10/14/2023 at AM Patient Yes Yes   Sig: Take 1 Tablet by mouth every morning.   escitalopram (LEXAPRO) 10 MG Tab 10/13/2023 at 1700 Patient Yes Yes   Sig: Take 10 mg by mouth every evening.   metFORMIN ER (GLUCOPHAGE XR) 500 MG TABLET SR 24 HR 10/13/2023 at 1700 Patient Yes Yes   Sig: Take 500 mg by mouth every evening.   naproxen (ALEVE) 220 MG tablet 10/13/2023 at PM Patient Yes Yes   Sig: Take 220-660 mg by mouth every 6 hours as needed (PAIN).      Facility-Administered Medications: None     Physical Exam  Temp:  [36.5 °C (97.7 °F)-36.7 °C (98 °F)] 36.5 °C (97.7 °F)  Pulse:  [43-72] 47  Resp:  [14-18] 18  BP: (103-152)/(63-94) 123/71  SpO2:  [95 %-100 %] 99 %  Blood Pressure: 118/65   Temperature: 36.7 °C (98 °F)   Pulse: (!) 48   Respiration: 14   Pulse Oximetry: 100 %     Physical Exam  Constitutional:       General: She is not in acute distress.  HENT:      Head: Normocephalic and atraumatic.      Comments: Right jaw swelling and tenderness     Nose: No congestion or rhinorrhea.      Mouth/Throat:      Mouth: Mucous membranes are dry.      Pharynx: No oropharyngeal exudate or posterior oropharyngeal erythema.   Eyes:      General: No scleral icterus.        Right eye: No discharge.         Left eye: No discharge.      Conjunctiva/sclera: Conjunctivae normal.      Pupils: Pupils are equal, round, and reactive to light.   Cardiovascular:      Rate and Rhythm: Normal rate and regular rhythm.      Heart sounds:      No friction rub. No gallop.   Pulmonary:      Effort: Pulmonary effort is normal.   Abdominal:       "General: Abdomen is flat. There is no distension.      Tenderness: There is no guarding.   Musculoskeletal:         General: No swelling.      Cervical back: Neck supple. No rigidity. No muscular tenderness.      Right lower leg: No edema.      Left lower leg: No edema.   Skin:     General: Skin is dry.      Capillary Refill: Capillary refill takes 2 to 3 seconds.      Coloration: Skin is not jaundiced or pale.      Findings: No bruising or erythema.   Neurological:      Mental Status: She is alert and oriented to person, place, and time.   Psychiatric:         Mood and Affect: Mood normal.         Judgment: Judgment normal.       Laboratory:  Recent Labs     10/14/23  0950   WBC 10.4   RBC 4.91   HEMOGLOBIN 13.6   HEMATOCRIT 42.1   MCV 85.7   MCH 27.7   MCHC 32.3   RDW 40.6   PLATELETCT 243   MPV 9.6     Recent Labs     10/14/23  0950   SODIUM 138   POTASSIUM 3.9   CHLORIDE 103   CO2 25   GLUCOSE 99   BUN 14   CREATININE 0.66   CALCIUM 9.0     Recent Labs     10/14/23  0950   ALTSGPT 23   ASTSGOT 13   ALKPHOSPHAT 121*   TBILIRUBIN 0.6   GLUCOSE 99         No results for input(s): \"NTPROBNP\" in the last 72 hours.      No results for input(s): \"TROPONINT\" in the last 72 hours.    Imaging:  CT-SOFT TISSUE NECK WITH   Final Result      RIGHT mandibular molar tooth abscess with associated soft tissue inflammatory changes and reactive adenopathy.  No soft tissue abscess demonstrated.      [id number]    (Results Pending)     Assessment/Plan:  Justification for Admission Status  I anticipate this patient will require at least two midnights for appropriate medical management, necessitating inpatient admission because patient has a dental abscess with cellulitis will require intravenous antibiotics, intravenous fluids, oral surgery consultation and likely surgical intervention    Patient will need a Med/Surg bed on MEDICAL service.  The patient has a dental abscess with cellulitis    * Tooth abscess- (present on " admission)  Assessment & Plan  Progressively worsening pain swelling and difficulty swallowing  CT imaging show right mandibular molar tooth abscess  I will start Unasyn, follow on cultures and sensitivities    Facial cellulitis- (present on admission)  Assessment & Plan  Progressively worsening pain swelling and difficulty swallowing  CT imaging show right mandibular molar tooth abscess and soft tissue inflammatory changes  I will start Unasyn, follow on cultures and sensitivities     BMI 39.0-39.9,adult- (present on admission)  Assessment & Plan  Recommend weight loss through diet and exercise  May have difficulties with hypoxia postoperatively  Continuous pulse oximetry postoperatively  Consider CPAP or BiPAP postoperatively    Prediabetes- (present on admission)  Assessment & Plan  With no significant hyperglycemia  I will start short acting insulin for now  I will order Accu-Checks, hypoglycemia protocol  Adjust according to blood sugars trend     Dehydration- (present on admission)  Assessment & Plan  Likely secondary to reduced oral intake due to difficulty swallowing, eating.  In addition to increase insensible losses due to infection.   Encourage oral intake as tolerated, antiemetics as needed.  Intravenous hydration until adequate oral intake is achieved     Anxiety- (present on admission)  Assessment & Plan  Resume escitalopram      VTE prophylaxis: SCDs/TEDs

## 2023-10-14 NOTE — ED PROVIDER NOTES
"ED Provider Note    CHIEF COMPLAINT  No chief complaint on file.  Neck swelling    EXTERNAL RECORDS REVIEWED  None    HPI/ROS  LIMITATION TO HISTORY   Select: : None  OUTSIDE HISTORIAN(S):  None    Laura Susie Aden Caro is a 32 y.o. female who presents to the emergency department complaining of pain and swelling on the patient's neck on the right.  The patient has swelling the right side so tracking under her jawline down on the right of her neck.  She has pain with swallowing.  No difficulty breathing.  No fevers or chills.  She is prediabetic denies any other medical problems or complaints.    PAST MEDICAL HISTORY   has a past medical history of Patient denies medical problems.    SURGICAL HISTORY   has a past surgical history that includes other orthopedic surgery (1996) and primary c section (5/28/2017).    FAMILY HISTORY  Family History   Problem Relation Age of Onset    Thyroid Mother     Diabetes Father     Asthma Brother        SOCIAL HISTORY  Social History     Tobacco Use    Smoking status: Never    Smokeless tobacco: Not on file   Substance and Sexual Activity    Alcohol use: Yes    Drug use: No    Sexual activity: Yes     Partners: Male     Comment: none        CURRENT MEDICATIONS  Home Medications    **Home medications have not yet been reviewed for this encounter**         ALLERGIES  No Known Allergies    PHYSICAL EXAM  VITAL SIGNS: BP (!) 152/94   Pulse 72   Temp 36.7 °C (98 °F) (Temporal)   Resp 14   Ht 1.778 m (5' 10\")   Wt 124 kg (272 lb 14.9 oz)   SpO2 95%   BMI 39.16 kg/m²    Constitutional: Well developed, Well nourished, No acute distress, Non-toxic appearance.   HENT: Normocephalic, Atraumatic, Bilateral external ears normal, no obvious dental abscess visible inside the mouth.  The swelling on the right side of the jaw along the angle of the jaw and underneath the jaw.  Mild redness.  Eyes: PERRL, EOMI, Conjunctiva normal, No discharge.   Neck: Normal range of motion, swelling " as above  Cardiovascular: Normal heart rate, Normal rhythm,  Thorax & Lungs: Normal breath sounds, No respiratory distress,     Musculoskeletal: Good range of motion in all major joints.  Neurologic: Alert, No focal deficits noted.   Psychiatric: Affect normal      DIAGNOSTIC STUDIES / PROCEDURES      RADIOLOGY  I have independently interpreted the diagnostic imaging associated with this visit and am waiting the final reading from the radiologist.   My preliminary interpretation is as follows: I have reviewed the images and I see swelling but I do not see an obvious large abscess.  No airway compromise  Radiologist interpretation:     CT-SOFT TISSUE NECK WITH   Final Result      RIGHT mandibular molar tooth abscess with associated soft tissue inflammatory changes and reactive adenopathy.  No soft tissue abscess demonstrated.      [id number]    (Results Pending)         COURSE & MEDICAL DECISION MAKING    ED Observation Status?    INITIAL ASSESSMENT, COURSE AND PLAN  Care Narrative:     Patient presents with pain and swelling in the jaw on the right.  Differential diagnosis dental abscess, cellulitis Ludewig's angina, lymphadenopathy.    The patient is worked up with labs and imaging.      CT shows a dental abscess but no significant facial abscess is a lot of facial inflammation and swelling consistent with facial cellulitis.    Patient is not septic.  She is given IV Unasyn I spoke with the oral surgeon Dr. Uribe.    He will see the patient request to be hospitalized for IV antibiotics and he will see her later on today and likely build to do the surgery tomorrow.  I spoke with the [facility] hospitalist Dr. Gonzalez, Care is transferred at that time.    Patient is aware of the plan and agreeable her questions were answered.                    DISPOSITION AND DISCUSSIONS  I have discussed management of the patient with the following physicians and [facility]'s: Oral surgeon, [facility] hospitalist      FINAL  DIAGNOSIS  1. Dental abscess    2. Facial cellulitis           Electronically signed by: Raj Ghotra M.D., 10/14/2023 9:34 AM

## 2023-10-14 NOTE — ASSESSMENT & PLAN NOTE
Progressively worsening pain swelling and difficulty swallowing  CT imaging show right mandibular molar tooth abscess  I will start Unasyn, follow on cultures and sensitivities

## 2023-10-14 NOTE — PROGRESS NOTES
Received pt's report from ED RN. Pt transported via wheelchair and able to walk to bed, not in any distress on RA. AAOx4. Oriented to floor. Reported pain, medicated per MAR. Denies any N/V. Discussed POC. Fall risk precautions in place, locked bed in lowest position, call light within reach. All needs met at this time. Hourly rounding in place.

## 2023-10-14 NOTE — ED NOTES
Pt sitting up in gurney, no acute distress noted. Pt denies any pain at this time.   HCG blood work drawn and sent to lab.   Safety precautions in place including gurney locked in lowest position, both side rails up, call light within reach. Pt educated to use call light if requiring any assistance with getting oob.

## 2023-10-14 NOTE — ASSESSMENT & PLAN NOTE
Progressively worsening pain swelling and difficulty swallowing  CT imaging show right mandibular molar tooth abscess and soft tissue inflammatory changes  I will start Unasyn, follow on cultures and sensitivities

## 2023-10-14 NOTE — ASSESSMENT & PLAN NOTE
With no significant hyperglycemia  I will start short acting insulin for now  I will order Accu-Checks, hypoglycemia protocol  Adjust according to blood sugars trend

## 2023-10-14 NOTE — ASSESSMENT & PLAN NOTE
Likely secondary to reduced oral intake due to difficulty swallowing, eating.  In addition to increase insensible losses due to infection.   Encourage oral intake as tolerated, antiemetics as needed.  Intravenous hydration until adequate oral intake is achieved

## 2023-10-14 NOTE — ED NOTES
Pt AO4, sitting up in gurney. C/o 7/10 right lower dental pain, declines wanting any pain medication at this time.   PIV established, blood work sent to lab.   Safety precautions in place including gurney locked in lowest position, side rail up, call light within reach. Pt educated to use call light if requiring any assistance with getting oob.

## 2023-10-14 NOTE — ASSESSMENT & PLAN NOTE
Recommend weight loss through diet and exercise  May have difficulties with hypoxia postoperatively  Continuous pulse oximetry postoperatively  Consider CPAP or BiPAP postoperatively

## 2023-10-14 NOTE — CARE PLAN
The patient is Stable - Low risk of patient condition declining or worsening    Shift Goals  Clinical Goals: Pt's pain will be controlled 3/10 or less during this shift  Patient Goals: Pain control, rest comfortably  Family Goals: Updated with POC    Progress made toward(s) clinical / shift goals:  Pt reported minimal pain, given Tylenol, reported improvement. Denies any N/V.     Patient is not progressing towards the following goals:

## 2023-10-14 NOTE — ED NOTES
Pt states she has an abscessed tooth with facial swelling on the right side trending down into her throat which is making it difficult to swallow. Pt states symptoms started yesterday morning.

## 2023-10-15 ENCOUNTER — ANESTHESIA (OUTPATIENT)
Dept: SURGERY | Facility: MEDICAL CENTER | Age: 33
DRG: 158 | End: 2023-10-15

## 2023-10-15 ENCOUNTER — ANESTHESIA EVENT (OUTPATIENT)
Dept: SURGERY | Facility: MEDICAL CENTER | Age: 33
DRG: 158 | End: 2023-10-15

## 2023-10-15 VITALS
BODY MASS INDEX: 39.14 KG/M2 | HEIGHT: 70 IN | HEART RATE: 92 BPM | TEMPERATURE: 98.6 F | SYSTOLIC BLOOD PRESSURE: 118 MMHG | RESPIRATION RATE: 18 BRPM | WEIGHT: 273.37 LBS | OXYGEN SATURATION: 91 % | DIASTOLIC BLOOD PRESSURE: 74 MMHG

## 2023-10-15 LAB
ALBUMIN SERPL BCP-MCNC: 3.4 G/DL (ref 3.2–4.9)
ALBUMIN/GLOB SERPL: 1.1 G/DL
ALP SERPL-CCNC: 104 U/L (ref 30–99)
ALT SERPL-CCNC: 20 U/L (ref 2–50)
ANION GAP SERPL CALC-SCNC: 8 MMOL/L (ref 7–16)
AST SERPL-CCNC: 13 U/L (ref 12–45)
BILIRUB SERPL-MCNC: 0.3 MG/DL (ref 0.1–1.5)
BUN SERPL-MCNC: 16 MG/DL (ref 8–22)
CALCIUM ALBUM COR SERPL-MCNC: 9.3 MG/DL (ref 8.5–10.5)
CALCIUM SERPL-MCNC: 8.8 MG/DL (ref 8.4–10.2)
CHLORIDE SERPL-SCNC: 103 MMOL/L (ref 96–112)
CO2 SERPL-SCNC: 28 MMOL/L (ref 20–33)
CREAT SERPL-MCNC: 0.71 MG/DL (ref 0.5–1.4)
ERYTHROCYTE [DISTWIDTH] IN BLOOD BY AUTOMATED COUNT: 41.9 FL (ref 35.9–50)
GFR SERPLBLD CREATININE-BSD FMLA CKD-EPI: 116 ML/MIN/1.73 M 2
GLOBULIN SER CALC-MCNC: 3.1 G/DL (ref 1.9–3.5)
GLUCOSE BLD STRIP.AUTO-MCNC: 125 MG/DL (ref 65–99)
GLUCOSE BLD STRIP.AUTO-MCNC: 79 MG/DL (ref 65–99)
GLUCOSE SERPL-MCNC: 115 MG/DL (ref 65–99)
HCT VFR BLD AUTO: 38 % (ref 37–47)
HGB BLD-MCNC: 12.2 G/DL (ref 12–16)
MAGNESIUM SERPL-MCNC: 2 MG/DL (ref 1.5–2.5)
MCH RBC QN AUTO: 27.6 PG (ref 27–33)
MCHC RBC AUTO-ENTMCNC: 32.1 G/DL (ref 32.2–35.5)
MCV RBC AUTO: 86 FL (ref 81.4–97.8)
PLATELET # BLD AUTO: 208 K/UL (ref 164–446)
PMV BLD AUTO: 10.1 FL (ref 9–12.9)
POTASSIUM SERPL-SCNC: 4.1 MMOL/L (ref 3.6–5.5)
PROT SERPL-MCNC: 6.5 G/DL (ref 6–8.2)
RBC # BLD AUTO: 4.42 M/UL (ref 4.2–5.4)
SODIUM SERPL-SCNC: 139 MMOL/L (ref 135–145)
WBC # BLD AUTO: 9.6 K/UL (ref 4.8–10.8)

## 2023-10-15 PROCEDURE — 0CDXXZ0 EXTRACTION OF LOWER TOOTH, SINGLE, EXTERNAL APPROACH: ICD-10-PCS | Performed by: DENTIST

## 2023-10-15 PROCEDURE — 82962 GLUCOSE BLOOD TEST: CPT

## 2023-10-15 PROCEDURE — 160002 HCHG RECOVERY MINUTES (STAT): Performed by: DENTIST

## 2023-10-15 PROCEDURE — 700111 HCHG RX REV CODE 636 W/ 250 OVERRIDE (IP): Mod: JZ | Performed by: HOSPITALIST

## 2023-10-15 PROCEDURE — 700111 HCHG RX REV CODE 636 W/ 250 OVERRIDE (IP): Performed by: ANESTHESIOLOGY

## 2023-10-15 PROCEDURE — 83735 ASSAY OF MAGNESIUM: CPT

## 2023-10-15 PROCEDURE — 85027 COMPLETE CBC AUTOMATED: CPT

## 2023-10-15 PROCEDURE — 99239 HOSP IP/OBS DSCHRG MGMT >30: CPT | Performed by: HOSPITALIST

## 2023-10-15 PROCEDURE — 700101 HCHG RX REV CODE 250: Performed by: ANESTHESIOLOGY

## 2023-10-15 PROCEDURE — 160027 HCHG SURGERY MINUTES - 1ST 30 MINS LEVEL 2: Performed by: DENTIST

## 2023-10-15 PROCEDURE — 700101 HCHG RX REV CODE 250: Performed by: DENTIST

## 2023-10-15 PROCEDURE — 160009 HCHG ANES TIME/MIN: Performed by: DENTIST

## 2023-10-15 PROCEDURE — 160048 HCHG OR STATISTICAL LEVEL 1-5: Performed by: DENTIST

## 2023-10-15 PROCEDURE — 700105 HCHG RX REV CODE 258: Performed by: HOSPITALIST

## 2023-10-15 PROCEDURE — 80053 COMPREHEN METABOLIC PANEL: CPT

## 2023-10-15 PROCEDURE — 0CDWXZ0 EXTRACTION OF UPPER TOOTH, SINGLE, EXTERNAL APPROACH: ICD-10-PCS | Performed by: DENTIST

## 2023-10-15 PROCEDURE — 160038 HCHG SURGERY MINUTES - EA ADDL 1 MIN LEVEL 2: Performed by: DENTIST

## 2023-10-15 PROCEDURE — 94760 N-INVAS EAR/PLS OXIMETRY 1: CPT

## 2023-10-15 PROCEDURE — 160035 HCHG PACU - 1ST 60 MINS PHASE I: Performed by: DENTIST

## 2023-10-15 RX ORDER — DIPHENHYDRAMINE HYDROCHLORIDE 50 MG/ML
12.5 INJECTION INTRAMUSCULAR; INTRAVENOUS
Status: DISCONTINUED | OUTPATIENT
Start: 2023-10-15 | End: 2023-10-15 | Stop reason: HOSPADM

## 2023-10-15 RX ORDER — MIDAZOLAM HYDROCHLORIDE 1 MG/ML
INJECTION INTRAMUSCULAR; INTRAVENOUS PRN
Status: DISCONTINUED | OUTPATIENT
Start: 2023-10-15 | End: 2023-10-15 | Stop reason: SURG

## 2023-10-15 RX ORDER — IPRATROPIUM BROMIDE AND ALBUTEROL SULFATE 2.5; .5 MG/3ML; MG/3ML
3 SOLUTION RESPIRATORY (INHALATION)
Status: DISCONTINUED | OUTPATIENT
Start: 2023-10-15 | End: 2023-10-15 | Stop reason: HOSPADM

## 2023-10-15 RX ORDER — KETOROLAC TROMETHAMINE 30 MG/ML
INJECTION, SOLUTION INTRAMUSCULAR; INTRAVENOUS PRN
Status: DISCONTINUED | OUTPATIENT
Start: 2023-10-15 | End: 2023-10-15 | Stop reason: SURG

## 2023-10-15 RX ORDER — CHLORHEXIDINE GLUCONATE ORAL RINSE 1.2 MG/ML
15 SOLUTION DENTAL 2 TIMES DAILY
Status: DISCONTINUED | OUTPATIENT
Start: 2023-10-15 | End: 2023-10-15 | Stop reason: HOSPADM

## 2023-10-15 RX ORDER — LIDOCAINE HYDROCHLORIDE AND EPINEPHRINE BITARTRATE 20; .01 MG/ML; MG/ML
INJECTION, SOLUTION SUBCUTANEOUS
Status: DISCONTINUED | OUTPATIENT
Start: 2023-10-15 | End: 2023-10-15 | Stop reason: HOSPADM

## 2023-10-15 RX ORDER — SODIUM CHLORIDE, SODIUM LACTATE, POTASSIUM CHLORIDE, CALCIUM CHLORIDE 600; 310; 30; 20 MG/100ML; MG/100ML; MG/100ML; MG/100ML
INJECTION, SOLUTION INTRAVENOUS CONTINUOUS
Status: ACTIVE | OUTPATIENT
Start: 2023-10-15 | End: 2023-10-15

## 2023-10-15 RX ORDER — OXYCODONE HCL 5 MG/5 ML
5 SOLUTION, ORAL ORAL
Status: DISCONTINUED | OUTPATIENT
Start: 2023-10-15 | End: 2023-10-15 | Stop reason: HOSPADM

## 2023-10-15 RX ORDER — CHLORHEXIDINE GLUCONATE ORAL RINSE 1.2 MG/ML
15 SOLUTION DENTAL 2 TIMES DAILY
Qty: 150 ML | Refills: 0 | Status: SHIPPED | OUTPATIENT
Start: 2023-10-15 | End: 2023-10-20

## 2023-10-15 RX ORDER — ROCURONIUM BROMIDE 10 MG/ML
INJECTION, SOLUTION INTRAVENOUS PRN
Status: DISCONTINUED | OUTPATIENT
Start: 2023-10-15 | End: 2023-10-15 | Stop reason: SURG

## 2023-10-15 RX ORDER — ONDANSETRON 2 MG/ML
INJECTION INTRAMUSCULAR; INTRAVENOUS PRN
Status: DISCONTINUED | OUTPATIENT
Start: 2023-10-15 | End: 2023-10-15 | Stop reason: SURG

## 2023-10-15 RX ORDER — NAPROXEN SODIUM 220 MG
220 TABLET ORAL 2 TIMES DAILY WITH MEALS
COMMUNITY
Start: 2023-10-15

## 2023-10-15 RX ORDER — HALOPERIDOL 5 MG/ML
1 INJECTION INTRAMUSCULAR
Status: DISCONTINUED | OUTPATIENT
Start: 2023-10-15 | End: 2023-10-15 | Stop reason: HOSPADM

## 2023-10-15 RX ORDER — ONDANSETRON 2 MG/ML
4 INJECTION INTRAMUSCULAR; INTRAVENOUS
Status: DISCONTINUED | OUTPATIENT
Start: 2023-10-15 | End: 2023-10-15 | Stop reason: HOSPADM

## 2023-10-15 RX ORDER — AMOXICILLIN AND CLAVULANATE POTASSIUM 875; 125 MG/1; MG/1
1 TABLET, FILM COATED ORAL 2 TIMES DAILY
Qty: 14 TABLET | Refills: 0 | Status: SHIPPED | OUTPATIENT
Start: 2023-10-15 | End: 2023-10-22

## 2023-10-15 RX ORDER — HYDROMORPHONE HYDROCHLORIDE 1 MG/ML
0.2 INJECTION, SOLUTION INTRAMUSCULAR; INTRAVENOUS; SUBCUTANEOUS
Status: DISCONTINUED | OUTPATIENT
Start: 2023-10-15 | End: 2023-10-15 | Stop reason: HOSPADM

## 2023-10-15 RX ORDER — OXYCODONE HCL 5 MG/5 ML
10 SOLUTION, ORAL ORAL
Status: DISCONTINUED | OUTPATIENT
Start: 2023-10-15 | End: 2023-10-15 | Stop reason: HOSPADM

## 2023-10-15 RX ORDER — OXYCODONE HYDROCHLORIDE 5 MG/1
5 TABLET ORAL EVERY 6 HOURS PRN
Qty: 8 TABLET | Refills: 0 | Status: SHIPPED | OUTPATIENT
Start: 2023-10-15 | End: 2023-10-17

## 2023-10-15 RX ORDER — SODIUM CHLORIDE, SODIUM GLUCONATE, SODIUM ACETATE, POTASSIUM CHLORIDE AND MAGNESIUM CHLORIDE 526; 502; 368; 37; 30 MG/100ML; MG/100ML; MG/100ML; MG/100ML; MG/100ML
500 INJECTION, SOLUTION INTRAVENOUS CONTINUOUS
Status: DISCONTINUED | OUTPATIENT
Start: 2023-10-15 | End: 2023-10-15 | Stop reason: HOSPADM

## 2023-10-15 RX ORDER — DEXAMETHASONE SODIUM PHOSPHATE 4 MG/ML
INJECTION, SOLUTION INTRA-ARTICULAR; INTRALESIONAL; INTRAMUSCULAR; INTRAVENOUS; SOFT TISSUE PRN
Status: DISCONTINUED | OUTPATIENT
Start: 2023-10-15 | End: 2023-10-15 | Stop reason: SURG

## 2023-10-15 RX ORDER — LIDOCAINE HYDROCHLORIDE 20 MG/ML
INJECTION, SOLUTION EPIDURAL; INFILTRATION; INTRACAUDAL; PERINEURAL PRN
Status: DISCONTINUED | OUTPATIENT
Start: 2023-10-15 | End: 2023-10-15 | Stop reason: SURG

## 2023-10-15 RX ORDER — ACETAMINOPHEN 500 MG
500-1000 TABLET ORAL EVERY 6 HOURS PRN
Qty: 30 TABLET | Refills: 0 | COMMUNITY
Start: 2023-10-15

## 2023-10-15 RX ORDER — HYDROMORPHONE HYDROCHLORIDE 1 MG/ML
1 INJECTION, SOLUTION INTRAMUSCULAR; INTRAVENOUS; SUBCUTANEOUS
Status: DISCONTINUED | OUTPATIENT
Start: 2023-10-15 | End: 2023-10-15 | Stop reason: HOSPADM

## 2023-10-15 RX ORDER — HYDROMORPHONE HYDROCHLORIDE 1 MG/ML
0.4 INJECTION, SOLUTION INTRAMUSCULAR; INTRAVENOUS; SUBCUTANEOUS
Status: DISCONTINUED | OUTPATIENT
Start: 2023-10-15 | End: 2023-10-15 | Stop reason: HOSPADM

## 2023-10-15 RX ORDER — CEFAZOLIN SODIUM 1 G/3ML
INJECTION, POWDER, FOR SOLUTION INTRAMUSCULAR; INTRAVENOUS PRN
Status: DISCONTINUED | OUTPATIENT
Start: 2023-10-15 | End: 2023-10-15 | Stop reason: SURG

## 2023-10-15 RX ORDER — LIDOCAINE HYDROCHLORIDE 40 MG/ML
SOLUTION TOPICAL PRN
Status: DISCONTINUED | OUTPATIENT
Start: 2023-10-15 | End: 2023-10-15 | Stop reason: SURG

## 2023-10-15 RX ORDER — MEPERIDINE HYDROCHLORIDE 25 MG/ML
12.5 INJECTION INTRAMUSCULAR; INTRAVENOUS; SUBCUTANEOUS
Status: DISCONTINUED | OUTPATIENT
Start: 2023-10-15 | End: 2023-10-15 | Stop reason: HOSPADM

## 2023-10-15 RX ORDER — CHLORHEXIDINE GLUCONATE ORAL RINSE 1.2 MG/ML
15 SOLUTION DENTAL 2 TIMES DAILY
Qty: 150 ML | Refills: 0 | Status: SHIPPED | OUTPATIENT
Start: 2023-10-15 | End: 2023-10-15 | Stop reason: SDUPTHER

## 2023-10-15 RX ORDER — MIDAZOLAM HYDROCHLORIDE 1 MG/ML
1 INJECTION INTRAMUSCULAR; INTRAVENOUS
Status: DISCONTINUED | OUTPATIENT
Start: 2023-10-15 | End: 2023-10-15 | Stop reason: HOSPADM

## 2023-10-15 RX ADMIN — SODIUM CHLORIDE, POTASSIUM CHLORIDE, SODIUM LACTATE AND CALCIUM CHLORIDE: 600; 310; 30; 20 INJECTION, SOLUTION INTRAVENOUS at 00:33

## 2023-10-15 RX ADMIN — DEXAMETHASONE SODIUM PHOSPHATE 8 MG: 4 INJECTION INTRA-ARTICULAR; INTRALESIONAL; INTRAMUSCULAR; INTRAVENOUS; SOFT TISSUE at 13:52

## 2023-10-15 RX ADMIN — ONDANSETRON 4 MG: 2 INJECTION INTRAMUSCULAR; INTRAVENOUS at 14:04

## 2023-10-15 RX ADMIN — LIDOCAINE HYDROCHLORIDE 4 ML: 40 SOLUTION TOPICAL at 13:49

## 2023-10-15 RX ADMIN — MIDAZOLAM 2 MG: 1 INJECTION, SOLUTION INTRAMUSCULAR; INTRAVENOUS at 13:45

## 2023-10-15 RX ADMIN — SUGAMMADEX 200 MG: 100 INJECTION, SOLUTION INTRAVENOUS at 14:04

## 2023-10-15 RX ADMIN — CEFAZOLIN 3 G: 1 INJECTION, POWDER, FOR SOLUTION INTRAMUSCULAR; INTRAVENOUS at 13:46

## 2023-10-15 RX ADMIN — ROCURONIUM BROMIDE 50 MG: 50 INJECTION, SOLUTION INTRAVENOUS at 13:49

## 2023-10-15 RX ADMIN — LIDOCAINE HYDROCHLORIDE 50 MG: 20 INJECTION, SOLUTION EPIDURAL; INFILTRATION; INTRACAUDAL at 13:49

## 2023-10-15 RX ADMIN — GLYCOPYRROLATE 0.2 MG: 0.2 INJECTION INTRAMUSCULAR; INTRAVENOUS at 13:52

## 2023-10-15 RX ADMIN — PROPOFOL 150 MG: 10 INJECTION, EMULSION INTRAVENOUS at 13:49

## 2023-10-15 RX ADMIN — AMPICILLIN AND SULBACTAM 3 G: 1; 2 INJECTION, POWDER, FOR SOLUTION INTRAMUSCULAR; INTRAVENOUS at 05:28

## 2023-10-15 RX ADMIN — KETOROLAC TROMETHAMINE 30 MG: 30 INJECTION, SOLUTION INTRAMUSCULAR; INTRAVENOUS at 14:04

## 2023-10-15 RX ADMIN — FENTANYL CITRATE 100 MCG: 50 INJECTION, SOLUTION INTRAMUSCULAR; INTRAVENOUS at 13:45

## 2023-10-15 ASSESSMENT — PAIN DESCRIPTION - PAIN TYPE
TYPE: ACUTE PAIN
TYPE: SURGICAL PAIN

## 2023-10-15 ASSESSMENT — PAIN SCALES - GENERAL: PAIN_LEVEL: 0

## 2023-10-15 NOTE — ANESTHESIA POSTPROCEDURE EVALUATION
Patient: Laura Aden Caro    Procedure Summary     Date: 10/15/23 Room / Location:  OR  / SURGERY [facility]    Anesthesia Start: 1343 Anesthesia Stop:     Procedures:       EXTRACTION, TOOTH (Right: Mouth)      INCISION AND DRAINAGE (Right: Mouth) Diagnosis: (Dental Decay on Teeth #s 12 & 31, Oral Abscess)    Surgeons: Bowen Singer D.D.S. Responsible Provider: Michel Evans III, M.D.    Anesthesia Type: general ASA Status: 3 - Emergent          Final Anesthesia Type: general  Last vitals  BP   Blood Pressure: 120/64    Temp   36.7 °C (98.1 °F)    Pulse   97   Resp   16    SpO2   99 %      Anesthesia Post Evaluation    Patient location during evaluation: PACU  Patient participation: complete - patient participated  Level of consciousness: awake and alert  Pain score: 0    Airway patency: patent  Anesthetic complications: no  Cardiovascular status: hemodynamically stable  Respiratory status: acceptable  Hydration status: euvolemic    PONV: none          No notable events documented.     Nurse Pain Score: 0 (NPRS)

## 2023-10-15 NOTE — CONSULTS
Oral & Facial Surgery   Consultation Note    ID:  Laura Devliniman Aden Caro     HPI:  Pt with multi day hx of facial swelling and tooht pain    Chief Complaint:  tooth pain    Exam:   Mouth:  dorinda  30 Mm, tooth # 31 & 12 with gross decay present.  Mobility on tooth #31 as well.  Some mobility on 1 & 2.  Tenderness to palpation on tooh t#31.  Swelling in area of right mandible.  Tissue locally erythematous, induration present  Throat:  OP Clear    Imaging:   Maxillofacial CT:  Abscess located at right mandible  Pano:  Decay on 1,2,12 & 31    Assessment:  Facial abscess 2/2 odontogenic infection    Discussed R/B/A with pt regarding extractions with I&D.  Discussed risks such as pain, infection, bleeding, swelling, need for additional surgery, numbness, muscle weakness, nerve damage, as well as damage to adjacent structures.  Discussed possibility of penrose drain placement and need for good oral hygiene and cleaning/flushing of the drain.  Discussed other theeth that need ext, bu tpt only wanted to ext #31 & 12 at this time.  Emphasized follow up.  All questions answered, and pt verbalized understanding and agreement.  Written consents obtained.  .        Plan:    To OR for I&D with exts    Thank You,  Bowen Singer, DDS  [phone]

## 2023-10-15 NOTE — OP REPORT
Operative Report  Oral & Facial Surgery  Dental Extractions    Pt Name:  Laura Aden Caro     Date of Surgery: 10/15/2023     Surgeon:  Bowen Singer DDS    Assistant: None    Pre-Op Diagnosis:     Dental Decay on Teeth #s 12 & 31              Oral Abscess    Post Op Diagnosis:       Same    Operation Performed:           Extraction of teeth # 12 & 31    Description of Surgery    The pt was brought to the operating room by the anesthesia team.  A time out was performed and consents were verified.  The pt was then uneventfully induced into general anesthesia.  A endotracheal tube was inserted and secured by the anesthesia team.  An oropharyngeal throat pack was placed.  approximately 10 mL of 1% lidocaine with 1:100K epi was administered to the proposed surgical sites.     A 15 blade was used to make an incision on the mandibular left vestibule in the area of #31.  Blunt dissection was then carried out to the bone no purulence was encountered.  Copious irrigation was placed in the wound until clear return was encountered.      A 15 blade was used to make a sulcular incision along the sites to be extracted.  A full thickness mucoperiosteal flap was elevated.  Teeth #s 12 & 31 were removed with appropriate bone removal and sectioning with forceps and elevators.  Rongeurs and bone files were used to smooth the alveolus of the bone.  Copious irrigation was placed in the surgical sites.  The throat pack was then removed and hemostasis achieved.  The pt was then turned back over to the anesthesia team, was awakened and extubated uneventfully and taken to the PACU in stable condition.      Estimated Blood Loss:  10 mL  Needle and sponge count:  Correct  Specimens Removed:  Teeth #s 12 & 31    Bowen Singer DDS

## 2023-10-15 NOTE — PROGRESS NOTES
1900 - Received bedside patient report from GAMAL Blanco. Patient is awake, alert & oriented x4. Patient on room air. IV fluids infusing. Patient resting in bed. Patient educated on call light use for needs. Belongings within reach. Bed at lowest position. Safety precautions in place.     2058 - Medication administered per MAR.    2348 - Medication administered per MAR.    0033 - Medication administered per MAR.

## 2023-10-15 NOTE — PROGRESS NOTES
Pt tolerated extractions and I&D well. Excellent prognosis for recovery . When pt is medically stable, OK to dc from OMFS standpoint.  Pt can f/u with my office in 1 week . Recommend dc with Augmentin, pain meds and peridex.      Thank you,  Bowen Singer, DDS  [phone]

## 2023-10-15 NOTE — HOSPITAL COURSE
Laura Richards Markie Bernstein is a 32 y.o. female with no significant past medical history other than elevated BMI, anxiety and prediabetes who presented 10/14/2023 with progressively worsening facial pain and swelling on the right side.  She denies having fevers or chills.

## 2023-10-15 NOTE — DISCHARGE SUMMARY
Discharge Summary    CHIEF COMPLAINT ON ADMISSION  Chief Complaint   Patient presents with    Facial Swelling     Right lower jaw/right neck swelling started yesterday. Pt c/o abscessed tooth (right lower second to last molar)- pt reports pain and purulent drainage at gumline.        Reason for Admission  Facial Swelling     Admission Date  10/14/2023    CODE STATUS  Full Code    HPI & HOSPITAL COURSE    Laura Aden Caro is a 32 y.o. female with no significant past medical history other than elevated BMI, anxiety and prediabetes who presented 10/14/2023 with progressively worsening facial pain and swelling on the right side.  CT revealed right mandibular molar tooth abscess with soft tissue inflammatory changes.  She was admitted and started on IV Unasyn and evaluated by oral surgery.  On evaluation this morning her symptoms are improved.  She was taken to the operating room by Dr. Ledesma and underwent dental extraction and tolerated it well.  She has been cleared by oral surgery for discharge.    Therefore, she is discharged in good and stable condition to home with close outpatient follow-up.    The patient recovered much more quickly than anticipated on admission.    Discharge Date  10/15/2023    FOLLOW UP ITEMS POST DISCHARGE  Follow-up with PCP and oral surgeon    DISCHARGE DIAGNOSES  Principal Problem:    Tooth abscess (POA: Yes)  Active Problems:    Facial cellulitis (POA: Yes)    Dehydration (POA: Yes)    Prediabetes (POA: Yes)    Anxiety (POA: Yes)    BMI 39.0-39.9,adult (POA: Yes)  Resolved Problems:    * No resolved hospital problems. *      FOLLOW UP  No future appointments.  No follow-up provider specified.    MEDICATIONS ON DISCHARGE     Medication List        START taking these medications        Instructions   acetaminophen 500 MG Tabs  Commonly known as: Tylenol   Take 1-2 Tablets by mouth every 6 hours as needed for Mild Pain or Moderate Pain.  Dose: 500-1,000 mg      amoxicillin-clavulanate 875-125 MG Tabs  Commonly known as: Augmentin   Take 1 Tablet by mouth 2 times a day for 7 days.  Dose: 1 Tablet     chlorhexidine 0.12 % Soln  Commonly known as: Peridex   Take 15 mL by mouth 2 times a day for 5 days. Swish and spit  Dose: 15 mL     oxyCODONE immediate-release 5 MG Tabs  Commonly known as: Roxicodone   Take 1 Tablet by mouth every 6 hours as needed for Severe Pain for up to 2 days.  Dose: 5 mg            CHANGE how you take these medications        Instructions   naproxen 220 MG tablet  What changed:   how much to take  when to take this  reasons to take this  Commonly known as: Aleve   Take 1 Tablet by mouth 2 times a day with meals.  Dose: 220 mg            CONTINUE taking these medications        Instructions   escitalopram 10 MG Tabs  Commonly known as: Lexapro   Take 10 mg by mouth every evening.  Dose: 10 mg     metFORMIN  MG Tb24  Commonly known as: Glucophage XR   Take 500 mg by mouth every evening.  Dose: 500 mg     VITAMIN K2-VITAMIN D3 PO   Take 1 Tablet by mouth every morning.  Dose: 1 Tablet            STOP taking these medications      AMOXIL PO              Allergies  No Known Allergies    DIET  Orders Placed This Encounter   Procedures    Diet NPO Restrict to: Strict     Standing Status:   Standing     Number of Occurrences:   8     Order Specific Question:   Diet NPO Restrict to:     Answer:   Strict [1]       ACTIVITY  As tolerated.  Weight bearing as tolerated    CONSULTATIONS  Oral surgeon    PROCEDURES  Dental extraction and I&D    LABORATORY  Lab Results   Component Value Date    SODIUM 139 10/15/2023    POTASSIUM 4.1 10/15/2023    CHLORIDE 103 10/15/2023    CO2 28 10/15/2023    GLUCOSE 115 (H) 10/15/2023    BUN 16 10/15/2023    CREATININE 0.71 10/15/2023        Lab Results   Component Value Date    WBC 9.6 10/15/2023    HEMOGLOBIN 12.2 10/15/2023    HEMATOCRIT 38.0 10/15/2023    PLATELETCT 208 10/15/2023        Total time of the discharge  process exceeds 35 minutes.

## 2023-10-15 NOTE — CARE PLAN
The patient is Stable - Low risk of patient condition declining or worsening    Shift Goals  Clinical Goals: Patient will report pain improvement after interventions this shift, NPO strict at midnight  Patient Goals: Pain management, rest      Progress made toward(s) clinical / shift goals:  Patient reports pain improvement after interventions. Patient NPO strict since midnight. Patient tolerating IV fluids and IV antibiotics.    Patient is not progressing towards the following goals: N/A

## 2023-10-15 NOTE — PROGRESS NOTES
Bedside report received from night RN. Assumed care of patient. Alert and oriented X4, resting comfortably in bed. Daily plan of care discussed. Pt denies intolerable pain, nausea and vomiting at this time. Call light and personal belongings within reach Hourly rounding in place.      1500 pt got back from PACU, no complains at this time. VS monitored.

## 2023-10-15 NOTE — ANESTHESIA PREPROCEDURE EVALUATION
Case: [id number] Date/Time: 10/15/23 1325    Procedures:       EXTRACTION, TOOTH      INCISION AND DRAINAGE    Location: [facility] OR 01 / SURGERY [facility]    Surgeons: Bowen Singer D.D.S.          Relevant Problems   No relevant active problems       Physical Exam    Airway   Mallampati: II  TM distance: >3 FB  Neck ROM: full       Cardiovascular - normal exam  Rhythm: regular  Rate: normal  (-) murmur     Dental - normal exam           Pulmonary - normal exam  Breath sounds clear to auscultation     Abdominal    Neurological - normal exam         Other findings: Morbid obesity            Anesthesia Plan    ASA 3- EMERGENT       Plan - general       Airway plan will be ETT          Induction: intravenous    Postoperative Plan: Postoperative administration of opioids is intended.    Pertinent diagnostic labs and testing reviewed    Informed Consent:    Anesthetic plan and risks discussed with patient.    Use of blood products discussed with: patient whom consented to blood products.

## 2023-10-15 NOTE — ANESTHESIA PROCEDURE NOTES
Airway    Date/Time: 10/15/2023 1:49 PM    Performed by: Michel Evans III, M.D.  Authorized by: Michel Evans III, M.D.    Location:  OR  Urgency:  Elective  Difficult Airway: No    Indications for Airway Management:  Anesthesia      Spontaneous Ventilation: absent    Sedation Level:  Deep  Preoxygenated: Yes    Patient Position:  Sniffing  Final Airway Type:  Endotracheal airway  Final Endotracheal Airway:  ETT  Cuffed: Yes    Technique Used for Successful ETT Placement:  Direct laryngoscopy    Insertion Site:  Oral  Blade Type:  Hart  Laryngoscope Blade/Videolaryngoscope Blade Size:  2  ETT Size (mm):  7.0  Measured from:  Lips  ETT to Lips (cm):  22  Placement Verified by: auscultation and capnometry    Cormack-Lehane Classification:  Grade III - view of epiglottis only  Number of Attempts at Approach:  1

## 2023-10-15 NOTE — OR NURSING
1418- To PACU from OR via gurney,  respirations spontaneous and non-labored. PT AAOx4. Pt denies pain or nausea. Ice pack applied to the cheeks.    1433- Gauze changed. Small amount of blood to the changed gauze. Family updated at this time.    1448- Patient met criteria for transfer to to room.   Patient states good pain control. Denies N/V, tolerating PO well. Surgical dressing in mouth. Ice pack sent with pt. Report called to Jackelin YEUNG.       1449- PT transported to room.

## 2023-10-15 NOTE — ANESTHESIA TIME REPORT
Anesthesia Start and Stop Event Times     Date Time Event    10/15/2023 1245 Ready for Procedure     1343 Anesthesia Start        Responsible Staff  10/15/23    Name Role Begin End    Michel Evans III, M.D. Anesth 1343         Overtime Reason:  no overtime (within assigned shift)    Comments:

## 2023-10-16 NOTE — PROGRESS NOTES
4 Eyes Skin Assessment Completed by GAMAL Benítez and Bowen RN.    Head WDL  Ears WDL  Nose WDL  Mouth swollen  Neck WDL  Breast/Chest WDL  Shoulder Blades WDL  Spine WDL  (R) Arm/Elbow/Hand WDL  (L) Arm/Elbow/Hand WDL  Abdomen WDL  Groin WDL  Scrotum/Coccyx/Buttocks WDL  (R) Leg WDL  (L) Leg WDL  (R) Heel/Foot/Toe WDL  (L) Heel/Foot/Toe Jaundice          Devices In Places Blood Pressure Cuff and Pulse Ox      Interventions In Place Pillows    Possible Skin Injury No    Pictures Uploaded Into Epic N/A  Wound Consult Placed N/A  RN Wound Prevention Protocol Ordered No

## (undated) DEVICE — SPONGE GAUZESTER 4 X 4 4PLY - (128PK/CA)

## (undated) DEVICE — SLEEVE VASO CALF MED - (10PR/CA)

## (undated) DEVICE — SUTURE 3-0 CHROMIC GUT SH 27 (36PK/BX)"

## (undated) DEVICE — DRAPE LAPAROTOMY T SHEET - (12EA/CA)

## (undated) DEVICE — KIT  I.V. START (100EA/CA)

## (undated) DEVICE — PACK C-SECTION (2EA/CA)

## (undated) DEVICE — CATHETER IV NON-SAFETY 18 GA X 1 1/4 (50/BX 4BX/CA)

## (undated) DEVICE — NEEDLE NON SAFETY HYPO 22 GA X 1 1/2 IN (100/BX)

## (undated) DEVICE — GLOVE BIOGEL SZ 8 SURGICAL PF LTX - (50PR/BX 4BX/CA)

## (undated) DEVICE — SET EXTENSION WITH 2 PORTS (48EA/CA) ***PART #2C8610 IS A SUBSTITUTE*****

## (undated) DEVICE — PACK MINOR BASIN - (2EA/CA)

## (undated) DEVICE — ELECTRODE DUAL RETURN W/ CORD - (50/PK)

## (undated) DEVICE — BLADE SURGICAL #11 - (50/BX)

## (undated) DEVICE — TUBING CLEARLINK DUO-VENT - C-FLO (48EA/CA)

## (undated) DEVICE — GLOVE, BIOGEL ECLIPSE, SZ 7.0, PF LTX (50/BX)

## (undated) DEVICE — SODIUM CHL IRRIGATION 0.9% 1000ML (12EA/CA)

## (undated) DEVICE — SUTURE GENERAL

## (undated) DEVICE — DETERGENT RENUZYME PLUS 10 OZ PACKET (50/BX)

## (undated) DEVICE — GLOVE BIOGEL SZ 7.5 SURGICAL PF LTX - (50PR/BX 4BX/CA)

## (undated) DEVICE — HEAD HOLDER JUNIOR/ADULT

## (undated) DEVICE — LACTATED RINGERS INJ 1000 ML - (14EA/CA 60CA/PF)

## (undated) DEVICE — STAPLER SKIN DISP - (6/BX 10BX/CA) VISISTAT

## (undated) DEVICE — SODIUM CHL. IRRIGATION 0.9% 3000ML (4EA/CA 65CA/PF)

## (undated) DEVICE — WATER IRRIG. STER. 1500 ML - (9/CA)

## (undated) DEVICE — 0 CHROMIC CT-1

## (undated) DEVICE — SYRINGE 10 ML CONTROL LL (25EA/BX 4BX/CA)

## (undated) DEVICE — TRAY SPINAL ANESTHESIA NON-SAFETY (10/CA)

## (undated) DEVICE — COVER LIGHT HANDLE FLEXIBLE - SOFT (2EA/PK 80PK/CA)

## (undated) DEVICE — SUTURE 0 VICRYL PLUS CT-1 - 36 INCH (36/BX)

## (undated) DEVICE — DRAPE SURGICAL U 77X120 - (10/CA)

## (undated) DEVICE — PAD LAP STERILE 18 X 18 - (5/PK 40PK/CA)

## (undated) DEVICE — TOWEL STOP TIMEOUT SAFETY FLAG (40EA/CA)

## (undated) DEVICE — BLADE SURGICAL #15 - (50/BX 3BX/CA)